# Patient Record
Sex: FEMALE | Race: OTHER | Employment: UNEMPLOYED | URBAN - METROPOLITAN AREA
[De-identification: names, ages, dates, MRNs, and addresses within clinical notes are randomized per-mention and may not be internally consistent; named-entity substitution may affect disease eponyms.]

---

## 2020-03-06 ENCOUNTER — OFFICE VISIT (OUTPATIENT)
Dept: OBGYN CLINIC | Facility: CLINIC | Age: 35
End: 2020-03-06
Payer: MEDICAID

## 2020-03-06 VITALS
DIASTOLIC BLOOD PRESSURE: 74 MMHG | WEIGHT: 141.13 LBS | HEART RATE: 80 BPM | BODY MASS INDEX: 25.97 KG/M2 | HEIGHT: 62 IN | SYSTOLIC BLOOD PRESSURE: 115 MMHG

## 2020-03-06 DIAGNOSIS — N92.6 MISSED MENSES: Primary | ICD-10-CM

## 2020-03-06 LAB
CONTROL LINE PRESENT WITH A CLEAR BACKGROUND (YES/NO): YES YES/NO
KIT LOT #: NORMAL NUMERIC
PREGNANCY TEST, URINE: POSITIVE

## 2020-03-06 PROCEDURE — 81025 URINE PREGNANCY TEST: CPT | Performed by: ADVANCED PRACTICE MIDWIFE

## 2020-03-06 PROCEDURE — 99202 OFFICE O/P NEW SF 15 MIN: CPT | Performed by: ADVANCED PRACTICE MIDWIFE

## 2020-03-06 NOTE — PROGRESS NOTES
Claudette Case is a 36year old female. HPI:   Patient presents with:  Gyn Exam: Missed menses      Sure LMP. Hx of  x 3 with CNM's at [facility]. Unmedicated birth with last baby. + Nausea all day, occasional vomiting. Denies pain or bleeding.      Apoorva Rushing tolerable to notify CNM  2. Reviewed pregnancy recommendations regarding weight gain, diet, fish consumption, consumption of deli-meats and hot, dogs, making sure food is appropriately cooked and washed to avoid food-borne illnesses.     3.  Travel discuss

## 2020-03-09 ENCOUNTER — LAB ENCOUNTER (OUTPATIENT)
Dept: LAB | Facility: HOSPITAL | Age: 35
End: 2020-03-09
Attending: ADVANCED PRACTICE MIDWIFE
Payer: MEDICAID

## 2020-03-09 ENCOUNTER — NURSE ONLY (OUTPATIENT)
Dept: OBGYN CLINIC | Facility: CLINIC | Age: 35
End: 2020-03-09
Payer: MEDICAID

## 2020-03-09 VITALS — WEIGHT: 142.38 LBS | BODY MASS INDEX: 26 KG/M2

## 2020-03-09 DIAGNOSIS — Z34.80 SUPERVISION OF OTHER NORMAL PREGNANCY: Primary | ICD-10-CM

## 2020-03-09 DIAGNOSIS — Z34.80 SUPERVISION OF OTHER NORMAL PREGNANCY: ICD-10-CM

## 2020-03-09 LAB
ANTIBODY SCREEN: NEGATIVE
BASOPHILS # BLD AUTO: 0.07 X10(3) UL (ref 0–0.2)
BASOPHILS NFR BLD AUTO: 0.9 %
DEPRECATED RDW RBC AUTO: 50 FL (ref 35.1–46.3)
EOSINOPHIL # BLD AUTO: 0.11 X10(3) UL (ref 0–0.7)
EOSINOPHIL NFR BLD AUTO: 1.4 %
ERYTHROCYTE [DISTWIDTH] IN BLOOD BY AUTOMATED COUNT: 14.8 % (ref 11–15)
HBV SURFACE AG SER-ACNC: <0.1 [IU]/L
HBV SURFACE AG SERPL QL IA: NONREACTIVE
HCT VFR BLD AUTO: 39.5 % (ref 35–48)
HCV AB SERPL QL IA: NONREACTIVE
HGB BLD-MCNC: 13.1 G/DL (ref 12–16)
IMM GRANULOCYTES # BLD AUTO: 0.03 X10(3) UL (ref 0–1)
IMM GRANULOCYTES NFR BLD: 0.4 %
LYMPHOCYTES # BLD AUTO: 1.69 X10(3) UL (ref 1–4)
LYMPHOCYTES NFR BLD AUTO: 21.1 %
MCH RBC QN AUTO: 30.5 PG (ref 26–34)
MCHC RBC AUTO-ENTMCNC: 33.2 G/DL (ref 31–37)
MCV RBC AUTO: 91.9 FL (ref 80–100)
MONOCYTES # BLD AUTO: 0.49 X10(3) UL (ref 0.1–1)
MONOCYTES NFR BLD AUTO: 6.1 %
NEUTROPHILS # BLD AUTO: 5.63 X10 (3) UL (ref 1.5–7.7)
NEUTROPHILS # BLD AUTO: 5.63 X10(3) UL (ref 1.5–7.7)
NEUTROPHILS NFR BLD AUTO: 70.1 %
PLATELET # BLD AUTO: 219 10(3)UL (ref 150–450)
RBC # BLD AUTO: 4.3 X10(6)UL (ref 3.8–5.3)
RH BLOOD TYPE: POSITIVE
RUBV IGG SER QL: POSITIVE
RUBV IGG SER-ACNC: 161 IU/ML (ref 10–?)
WBC # BLD AUTO: 8 X10(3) UL (ref 4–11)

## 2020-03-09 PROCEDURE — 86803 HEPATITIS C AB TEST: CPT

## 2020-03-09 PROCEDURE — 87389 HIV-1 AG W/HIV-1&-2 AB AG IA: CPT

## 2020-03-09 PROCEDURE — 87086 URINE CULTURE/COLONY COUNT: CPT

## 2020-03-09 PROCEDURE — 36415 COLL VENOUS BLD VENIPUNCTURE: CPT

## 2020-03-09 PROCEDURE — 86780 TREPONEMA PALLIDUM: CPT

## 2020-03-09 PROCEDURE — 86850 RBC ANTIBODY SCREEN: CPT

## 2020-03-09 PROCEDURE — 85025 COMPLETE CBC W/AUTO DIFF WBC: CPT

## 2020-03-09 PROCEDURE — 0500F INITIAL PRENATAL CARE VISIT: CPT | Performed by: ADVANCED PRACTICE MIDWIFE

## 2020-03-09 PROCEDURE — 86900 BLOOD TYPING SEROLOGIC ABO: CPT

## 2020-03-09 PROCEDURE — 87340 HEPATITIS B SURFACE AG IA: CPT

## 2020-03-09 PROCEDURE — 86901 BLOOD TYPING SEROLOGIC RH(D): CPT

## 2020-03-09 PROCEDURE — 86762 RUBELLA ANTIBODY: CPT

## 2020-03-09 RX ORDER — GLYCERIN/MINERAL OIL
LOTION (ML) TOPICAL
Status: ON HOLD | COMMUNITY
End: 2020-11-01

## 2020-03-09 RX ORDER — MULTIVITAMIN WITH IRON
25 TABLET ORAL 2 TIMES DAILY
COMMUNITY
End: 2020-08-20

## 2020-03-09 NOTE — PROGRESS NOTES
Nurse education complete & information given to pt. NOB labs ordered. Declines FTS. Last pap 4/1/19 & normal. Denies any abnormal paps. Received varicella vaccine. EPDS completed, score 2, ALDEN score 2. NOB appt scheduled.     Pt desires natural childbirth

## 2020-03-11 LAB — T PALLIDUM AB SER QL: NEGATIVE

## 2020-03-30 ENCOUNTER — TELEPHONE (OUTPATIENT)
Dept: OBGYN CLINIC | Facility: CLINIC | Age: 35
End: 2020-03-30

## 2020-04-01 ENCOUNTER — TELEPHONE (OUTPATIENT)
Dept: OBGYN CLINIC | Facility: CLINIC | Age: 35
End: 2020-04-01

## 2020-04-06 ENCOUNTER — INITIAL PRENATAL (OUTPATIENT)
Dept: OBGYN CLINIC | Facility: CLINIC | Age: 35
End: 2020-04-06
Payer: MEDICAID

## 2020-04-06 ENCOUNTER — HOSPITAL ENCOUNTER (OUTPATIENT)
Dept: ULTRASOUND IMAGING | Facility: HOSPITAL | Age: 35
Discharge: HOME | End: 2020-04-06
Attending: ADVANCED PRACTICE MIDWIFE
Payer: MEDICAID

## 2020-04-06 VITALS
DIASTOLIC BLOOD PRESSURE: 73 MMHG | SYSTOLIC BLOOD PRESSURE: 113 MMHG | WEIGHT: 145 LBS | BODY MASS INDEX: 27 KG/M2 | HEART RATE: 82 BPM

## 2020-04-06 DIAGNOSIS — Z34.81 ENCOUNTER FOR SUPERVISION OF OTHER NORMAL PREGNANCY IN FIRST TRIMESTER: Primary | ICD-10-CM

## 2020-04-06 DIAGNOSIS — Z11.3 SCREEN FOR STD (SEXUALLY TRANSMITTED DISEASE): ICD-10-CM

## 2020-04-06 DIAGNOSIS — N89.8 VAGINAL DISCHARGE: ICD-10-CM

## 2020-04-06 DIAGNOSIS — Z34.81 ENCOUNTER FOR SUPERVISION OF OTHER NORMAL PREGNANCY IN FIRST TRIMESTER: ICD-10-CM

## 2020-04-06 PROCEDURE — 0500F INITIAL PRENATAL CARE VISIT: CPT | Performed by: ADVANCED PRACTICE MIDWIFE

## 2020-04-06 PROCEDURE — 81002 URINALYSIS NONAUTO W/O SCOPE: CPT | Performed by: ADVANCED PRACTICE MIDWIFE

## 2020-04-06 PROCEDURE — 76801 OB US < 14 WKS SINGLE FETUS: CPT | Performed by: ADVANCED PRACTICE MIDWIFE

## 2020-04-06 NOTE — PATIENT INSTRUCTIONS
COVID-19 is a new disease and we are still learning how it spreads, the severity of illness it causes, and to what extent it may spread in the [place]. Pregnant Women   What is the risk to pregnant women of getting COVID-19?  Is it easier for pregn number, the virus was not found in samples of amniotic fluid or breastmilk. Infants   If a pregnant woman has COVID-19 during pregnancy, will it hurt the baby?   We do not know at this time what if any risk is posed to infants of a pregnant woman who has exceptions when breastfeeding or feeding expressed breast milk is not recommended. CDC has no specific guidance for breastfeeding during infection with similar viruses like SARS-CoV or [place] Respiratory Syndrome (MERS-CoV).   Outside of the immedia

## 2020-04-06 NOTE — PROGRESS NOTES
NOB teaching reviewed  Vagnosis screen completed  U/S ordered s>d  Covid precautions  Warning signs reviewed  All questions answered

## 2020-04-08 ENCOUNTER — TELEPHONE (OUTPATIENT)
Dept: OBGYN CLINIC | Facility: CLINIC | Age: 35
End: 2020-04-08

## 2020-04-08 RX ORDER — ONDANSETRON 4 MG/1
4 TABLET, FILM COATED ORAL
Qty: 30 TABLET | Refills: 0 | Status: SHIPPED | OUTPATIENT
Start: 2020-04-08 | End: 2020-05-08

## 2020-04-08 NOTE — TELEPHONE ENCOUNTER
----- Message from Simone Adam CNM sent at 4/8/2020  8:57 AM CDT -----  Vaginal culture positive for yeast.  Pt can use Monostat 7 day OTC safe in pregnancy

## 2020-04-08 NOTE — TELEPHONE ENCOUNTER
notified pt of results & instructions per [facility]. Pt states [facility] told her at appt she was going to prescribe antinausea rx. Advised I will send to [facility] & get back to her. Pharmacy confirmed.  Pt verbalized an understanding & agrees w/ plan    Routed to EDWIN

## 2020-05-06 ENCOUNTER — TELEMEDICINE (OUTPATIENT)
Dept: OBGYN CLINIC | Facility: CLINIC | Age: 35
End: 2020-05-06

## 2020-05-06 DIAGNOSIS — Z34.82 ENCOUNTER FOR SUPERVISION OF OTHER NORMAL PREGNANCY IN SECOND TRIMESTER: Primary | ICD-10-CM

## 2020-05-06 PROCEDURE — 0502F SUBSEQUENT PRENATAL CARE: CPT | Performed by: ADVANCED PRACTICE MIDWIFE

## 2020-05-06 NOTE — PROGRESS NOTES
Video visit completed. Will schedule 20 week scan. Nausea improved but still occasionally vomiting. Denies DV concerns. APpropriate for phone / virtual visit with next. Danger signs reviewed.

## 2020-06-03 ENCOUNTER — TELEPHONE (OUTPATIENT)
Dept: OBGYN CLINIC | Facility: CLINIC | Age: 35
End: 2020-06-03

## 2020-06-03 ENCOUNTER — TELEPHONE (OUTPATIENT)
Dept: PERINATAL CARE | Facility: HOSPITAL | Age: 35
End: 2020-06-03

## 2020-06-03 NOTE — TELEPHONE ENCOUNTER
----- Message from [facility] sent at 6/3/2020  8:52 AM CDT -----  Regarding: [facility] PRIOR AUTHORIZATION  PATIENT SEEMS TO HAVE SWITCHED TO [facility] MEDICAID. WILL NEED PRIOR AUTHORIZATION FOR APPOINTMENT SCHEDULED 6/9/20.       Cecelia

## 2020-06-03 NOTE — TELEPHONE ENCOUNTER
Insurance info not updated. Cannot do PA. Advised pt I need [employer] for a [place]. Pt does not have a card but will go online & either call back or send a [employer] message w/ info.  Pt verbalized an understanding & agrees w/ plan

## 2020-06-08 NOTE — PROGRESS NOTES
/82   Pulse 96   Wt 154 lb (69.9 kg)   LMP 01/17/2020   BMI 28.17 kg/m²      STANDARD OBSTETRIC ULTRASOUND REPORT   See imaging tab for complete consultation / ultrasound report      Fetal Heart Rate: Present 142 bpm  Fetal Presentation: breech  Amni Debbi Servin. Apoorva Hartley M.D. This was an ultrasound only encounter (no physician visit). The ultrasound was read by Dr. Apoorva Hartley and the report was sent to Ms. Jennings to discuss with the patient.

## 2020-06-09 ENCOUNTER — TELEPHONE (OUTPATIENT)
Dept: OBGYN CLINIC | Facility: CLINIC | Age: 35
End: 2020-06-09

## 2020-06-09 ENCOUNTER — HOSPITAL ENCOUNTER (OUTPATIENT)
Dept: PERINATAL CARE | Facility: HOSPITAL | Age: 35
Discharge: HOME | End: 2020-06-09
Attending: OBSTETRICS & GYNECOLOGY
Payer: MEDICAID

## 2020-06-09 ENCOUNTER — HOSPITAL ENCOUNTER (OUTPATIENT)
Dept: PERINATAL CARE | Facility: HOSPITAL | Age: 35
Discharge: HOME | End: 2020-06-09
Attending: ADVANCED PRACTICE MIDWIFE
Payer: MEDICAID

## 2020-06-09 VITALS
BODY MASS INDEX: 28 KG/M2 | DIASTOLIC BLOOD PRESSURE: 82 MMHG | WEIGHT: 154 LBS | HEART RATE: 96 BPM | SYSTOLIC BLOOD PRESSURE: 132 MMHG

## 2020-06-09 DIAGNOSIS — Z36.3 ENCOUNTER FOR ANTENATAL SCREENING FOR MALFORMATION: ICD-10-CM

## 2020-06-09 DIAGNOSIS — O26.879 SHORT CERVIX AFFECTING PREGNANCY: ICD-10-CM

## 2020-06-09 DIAGNOSIS — Z36.3 ENCOUNTER FOR ANTENATAL SCREENING FOR MALFORMATION: Primary | ICD-10-CM

## 2020-06-09 DIAGNOSIS — O47.02 THREATENED PRETERM LABOR, SECOND TRIMESTER: Primary | ICD-10-CM

## 2020-06-09 DIAGNOSIS — Z03.75 SUSPECTED SHORTENING OF CERVIX NOT FOUND: ICD-10-CM

## 2020-06-09 PROBLEM — Z33.1 IUP (INTRAUTERINE PREGNANCY), INCIDENTAL: Status: ACTIVE | Noted: 2020-06-09

## 2020-06-09 PROCEDURE — 76805 OB US >/= 14 WKS SNGL FETUS: CPT | Performed by: OBSTETRICS & GYNECOLOGY

## 2020-06-09 PROCEDURE — 76817 TRANSVAGINAL US OBSTETRIC: CPT | Performed by: OBSTETRICS & GYNECOLOGY

## 2020-06-09 NOTE — TELEPHONE ENCOUNTER
Phoned patient to notify her of verbal report received from [facility] regarding shortened cervix and recommendation for nightly progesterone PV with repeat ultrasound in 1 week to measure cervical length. eRx Endometrin and ultrasound ordered placed.   Pt voiced

## 2020-06-11 ENCOUNTER — TELEPHONE (OUTPATIENT)
Dept: OBGYN CLINIC | Facility: CLINIC | Age: 35
End: 2020-06-11

## 2020-06-11 ENCOUNTER — TELEPHONE (OUTPATIENT)
Dept: PERINATAL CARE | Facility: HOSPITAL | Age: 35
End: 2020-06-11

## 2020-06-11 NOTE — TELEPHONE ENCOUNTER
----- Message from [facility] sent at 6/11/2020  8:37 AM CDT -----  Regarding: [facility] PRIOR AUTHORIZATION  PATIENT SCHEDULED 6/15/20 FOR CERVICAL LENGTH, WILL REQUIRE PRIOR AUTHORIZATION.     [address]

## 2020-06-12 ENCOUNTER — TELEPHONE (OUTPATIENT)
Dept: PERINATAL CARE | Facility: HOSPITAL | Age: 35
End: 2020-06-12

## 2020-06-12 NOTE — TELEPHONE ENCOUNTER
Pt Has appt 6/15 7:30 AM  [facility] has Prior Auth under Medical Review so Authorization form Insurance will not be obtained prior to appointment time  Pt Contacted to offer Appointments 6/17 and 6/19  Pt states she is going to [facility] Tuesday thr

## 2020-06-15 ENCOUNTER — ROUTINE PRENATAL (OUTPATIENT)
Dept: OBGYN CLINIC | Facility: CLINIC | Age: 35
End: 2020-06-15
Payer: MEDICAID

## 2020-06-15 ENCOUNTER — TELEPHONE (OUTPATIENT)
Dept: OBGYN CLINIC | Facility: CLINIC | Age: 35
End: 2020-06-15

## 2020-06-15 VITALS
HEART RATE: 80 BPM | BODY MASS INDEX: 28.96 KG/M2 | DIASTOLIC BLOOD PRESSURE: 72 MMHG | HEIGHT: 62 IN | SYSTOLIC BLOOD PRESSURE: 118 MMHG | WEIGHT: 157.38 LBS

## 2020-06-15 DIAGNOSIS — O26.879 SHORT CERVIX AFFECTING PREGNANCY: ICD-10-CM

## 2020-06-15 DIAGNOSIS — Z34.82 PRENATAL CARE, SUBSEQUENT PREGNANCY, SECOND TRIMESTER: Primary | ICD-10-CM

## 2020-06-15 PROCEDURE — 0502F SUBSEQUENT PRENATAL CARE: CPT | Performed by: ADVANCED PRACTICE MIDWIFE

## 2020-06-15 PROCEDURE — 81002 URINALYSIS NONAUTO W/O SCOPE: CPT | Performed by: ADVANCED PRACTICE MIDWIFE

## 2020-06-15 NOTE — PATIENT INSTRUCTIONS
Adapting to Pregnancy: Second Trimester    Keep up the healthy habits you started in your first trimester. You might be a little more tired than normal. So plan your day wisely. Look at the tips below and choose the ones that suit your lifestyle.   If you Continuing as lovers  Unless your healthcare provider tells you otherwise, there is no reason to stop having sex now. Blood supply increases to the pelvic area in the second trimester. Because of this, sex might be more enjoyable.  Try different positions a The second trimester is often easier than the first. Still, be prepared for mood swings. These are due to the increase in hormones (chemicals that affect the way organs work) produced by your body. These mood swings are a normal part of pregnancy.   How you

## 2020-06-15 NOTE — TELEPHONE ENCOUNTER
Pt has not been seen since early may. Going out of town tomorrow. mfm u/s rescheduled for next week. appt scheduled for today.  Pt verbalized an understanding & agrees w/ plan

## 2020-06-15 NOTE — TELEPHONE ENCOUNTER
pt. states that her MFM appt. was changed to 6/22/2020, so she wants to know when should she be seen for PN, and then the call was dropped?

## 2020-06-15 NOTE — PROGRESS NOTES
Dorothea David is here for her BRAD visit. Currently, she is feeling well. Denies 2nd trimester danger signs. Does feel some cramping on and off throughout the day but less than once/hour. Using progesterone vaginally each night.     Total weight gain in pregnan

## 2020-06-21 NOTE — PROGRESS NOTES
Outpatient Maternal-Fetal Medicine Consultation    Dear Ms. Pj Harmon: Thank you for requesting ultrasound evaluation and maternal fetal medicine consultation on your patient Glory Sam.   As you are aware she is a 30year old female  with a singl mucous membrane lesions. Past Surgical History  The patient  has no past surgical history on file. Family History  The patient She indicated that her mother is alive. She indicated that her father is alive.  She indicated that her maternal grandmother At that time the cervix a SHORT cervical length (2.63 cm shortened to 2.45 cm with fundal pressure) was noted with cervical funneling.   The patient's midwife was contacted and vaginal progesterone was advised as well as a follow-up cervical length assessm

## 2020-06-22 ENCOUNTER — TELEPHONE (OUTPATIENT)
Dept: OBGYN CLINIC | Facility: CLINIC | Age: 35
End: 2020-06-22

## 2020-06-22 ENCOUNTER — HOSPITAL ENCOUNTER (OUTPATIENT)
Dept: PERINATAL CARE | Facility: HOSPITAL | Age: 35
Discharge: HOME | End: 2020-06-22
Attending: OBSTETRICS & GYNECOLOGY
Payer: MEDICAID

## 2020-06-22 ENCOUNTER — HOSPITAL ENCOUNTER (OUTPATIENT)
Dept: PERINATAL CARE | Facility: HOSPITAL | Age: 35
Discharge: HOME | End: 2020-06-22
Attending: ADVANCED PRACTICE MIDWIFE
Payer: MEDICAID

## 2020-06-22 VITALS
BODY MASS INDEX: 28.89 KG/M2 | DIASTOLIC BLOOD PRESSURE: 82 MMHG | HEIGHT: 62 IN | WEIGHT: 157 LBS | HEART RATE: 113 BPM | SYSTOLIC BLOOD PRESSURE: 120 MMHG

## 2020-06-22 DIAGNOSIS — O26.879 SHORT CERVIX AFFECTING PREGNANCY: ICD-10-CM

## 2020-06-22 DIAGNOSIS — O26.879 SHORT CERVIX AFFECTING PREGNANCY: Primary | ICD-10-CM

## 2020-06-22 PROCEDURE — 76817 TRANSVAGINAL US OBSTETRIC: CPT | Performed by: OBSTETRICS & GYNECOLOGY

## 2020-06-22 PROCEDURE — 76815 OB US LIMITED FETUS(S): CPT | Performed by: OBSTETRICS & GYNECOLOGY

## 2020-06-22 PROCEDURE — 99203 OFFICE O/P NEW LOW 30 MIN: CPT | Performed by: OBSTETRICS & GYNECOLOGY

## 2020-06-22 NOTE — TELEPHONE ENCOUNTER
Pt needs cervical length for next week and cerclage placement for tomorrow 6/22/20.  PA initiated-       Cervical length-  [id number]  Case #-[id number]    Cerclage-  [facility]-[address]1986  [phone]-shortened cervix  Case #-[id number]    Case information faxed to [phone]

## 2020-06-23 ENCOUNTER — HOSPITAL ENCOUNTER (OUTPATIENT)
Facility: HOSPITAL | Age: 35
Setting detail: OBSERVATION
Discharge: HOME | End: 2020-06-23
Attending: OBSTETRICS & GYNECOLOGY | Admitting: OBSTETRICS & GYNECOLOGY
Payer: MEDICAID

## 2020-06-23 ENCOUNTER — ANESTHESIA (OUTPATIENT)
Dept: OBGYN UNIT | Facility: HOSPITAL | Age: 35
End: 2020-06-23
Payer: MEDICAID

## 2020-06-23 ENCOUNTER — ANESTHESIA EVENT (OUTPATIENT)
Dept: OBGYN UNIT | Facility: HOSPITAL | Age: 35
End: 2020-06-23
Payer: MEDICAID

## 2020-06-23 VITALS
HEART RATE: 75 BPM | TEMPERATURE: 98 F | WEIGHT: 154 LBS | DIASTOLIC BLOOD PRESSURE: 74 MMHG | OXYGEN SATURATION: 100 % | SYSTOLIC BLOOD PRESSURE: 121 MMHG | BODY MASS INDEX: 28.34 KG/M2 | RESPIRATION RATE: 16 BRPM | HEIGHT: 62 IN

## 2020-06-23 PROBLEM — O34.30 INCOMPETENT CERVIX IN PREGNANCY, ANTEPARTUM: Status: ACTIVE | Noted: 2020-06-23

## 2020-06-23 PROCEDURE — 81003 URINALYSIS AUTO W/O SCOPE: CPT | Performed by: OBSTETRICS & GYNECOLOGY

## 2020-06-23 PROCEDURE — 0UVC7ZZ RESTRICTION OF CERVIX, VIA NATURAL OR ARTIFICIAL OPENING: ICD-10-PCS | Performed by: OBSTETRICS & GYNECOLOGY

## 2020-06-23 PROCEDURE — 59320 REVISION OF CERVIX: CPT

## 2020-06-23 PROCEDURE — 85025 COMPLETE CBC W/AUTO DIFF WBC: CPT | Performed by: OBSTETRICS & GYNECOLOGY

## 2020-06-23 RX ORDER — LIDOCAINE HYDROCHLORIDE 10 MG/ML
INJECTION, SOLUTION EPIDURAL; INFILTRATION; INTRACAUDAL; PERINEURAL AS NEEDED
Status: DISCONTINUED | OUTPATIENT
Start: 2020-06-23 | End: 2020-06-23 | Stop reason: SURG

## 2020-06-23 RX ORDER — DIPHENHYDRAMINE HYDROCHLORIDE 50 MG/ML
25 INJECTION INTRAMUSCULAR; INTRAVENOUS ONCE AS NEEDED
Status: CANCELLED | OUTPATIENT
Start: 2020-06-23 | End: 2020-06-23

## 2020-06-23 RX ORDER — KETOROLAC TROMETHAMINE 30 MG/ML
30 INJECTION, SOLUTION INTRAMUSCULAR; INTRAVENOUS ONCE AS NEEDED
Status: CANCELLED | OUTPATIENT
Start: 2020-06-23 | End: 2020-06-23

## 2020-06-23 RX ORDER — ONDANSETRON 2 MG/ML
4 INJECTION INTRAMUSCULAR; INTRAVENOUS ONCE AS NEEDED
Status: CANCELLED | OUTPATIENT
Start: 2020-06-23 | End: 2020-06-23

## 2020-06-23 RX ORDER — BUPIVACAINE HYDROCHLORIDE 7.5 MG/ML
INJECTION, SOLUTION INTRASPINAL AS NEEDED
Status: DISCONTINUED | OUTPATIENT
Start: 2020-06-23 | End: 2020-06-23 | Stop reason: SURG

## 2020-06-23 RX ORDER — SODIUM CHLORIDE, SODIUM LACTATE, POTASSIUM CHLORIDE, CALCIUM CHLORIDE 600; 310; 30; 20 MG/100ML; MG/100ML; MG/100ML; MG/100ML
INJECTION, SOLUTION INTRAVENOUS CONTINUOUS
Status: CANCELLED | OUTPATIENT
Start: 2020-06-23

## 2020-06-23 RX ORDER — SODIUM CHLORIDE, SODIUM LACTATE, POTASSIUM CHLORIDE, CALCIUM CHLORIDE 600; 310; 30; 20 MG/100ML; MG/100ML; MG/100ML; MG/100ML
INJECTION, SOLUTION INTRAVENOUS CONTINUOUS
Status: DISCONTINUED | OUTPATIENT
Start: 2020-06-23 | End: 2020-06-23

## 2020-06-23 RX ADMIN — LIDOCAINE HYDROCHLORIDE 3 MG: 10 INJECTION, SOLUTION EPIDURAL; INFILTRATION; INTRACAUDAL; PERINEURAL at 12:31:00

## 2020-06-23 RX ADMIN — BUPIVACAINE HYDROCHLORIDE 1.2 ML: 7.5 INJECTION, SOLUTION INTRASPINAL at 12:36:00

## 2020-06-23 RX ADMIN — SODIUM CHLORIDE, SODIUM LACTATE, POTASSIUM CHLORIDE, CALCIUM CHLORIDE: 600; 310; 30; 20 INJECTION, SOLUTION INTRAVENOUS at 12:55:00

## 2020-06-23 NOTE — ANESTHESIA PROCEDURE NOTES
Spinal Block  Performed by: Amber Mcguire MD  Authorized by: Amber Mcguire MD       General Information and Staff    Start Time:  6/23/2020 12:31 PM  End Time:  6/23/2020 12:36 PM  Anesthesiologist:  Amber Mcguire MD  Performed by:   Zohaib

## 2020-06-23 NOTE — ANESTHESIA PREPROCEDURE EVALUATION
Anesthesia PreOp Note    HPI:     Violet Watkins is a 30year old female who presents for preoperative consultation requested by:  Johnita Duane, DO    Date of Surgery: 6/23/2020    Procedure(s):  CERCLAGE  Indication: cerclage placement    Relevant Prob children: 3      Years of education: 10      Highest education level: GED or equivalent    Occupational History      Occupation:     Social Needs      Financial resource strain: Not hard at all      Food insecurity:        Worry: Never tr MCV 89.3 06/23/2020    MCH 31.0 06/23/2020    MCHC 34.8 06/23/2020    RDW 14.4 06/23/2020    .0 06/23/2020             Vital Signs: Body mass index is 28.17 kg/m². height is 1.575 m (5' 2\") and weight is 69.9 kg (154 lb).  Her oral temperature

## 2020-06-23 NOTE — OPERATIVE REPORT
Preoperative Diagnosis:  1. IUP @ 22w4d                                      2.   Incompetent cervix                                 Postoperative diagnosis:    Same    Procedure:   Cerclage    Surgeon:  Uzma Estrada D.O.   Estimated Blood loss:   Less

## 2020-06-23 NOTE — ANESTHESIA POSTPROCEDURE EVALUATION
Patient: Jarocho Townsend    Procedure Summary     Date:  06/23/20 Room / Location:  [facility] L+D OR 01 / [facility] L[facility] OR    Anesthesia Start:  1226 Anesthesia Stop:      Procedure:  [id number] (N/A Cervix) Diagnosis:  (same as preop)    Surgeon:  DO SWATI Ni

## 2020-06-24 NOTE — H&P
[facility] AND [facility]  H&P    Claudette Case Patient Status:  Observation    3/15/1985 MRN [id number]   Location [address] Attending No att. providers found   Hosp Day # 0 PCP No primary care provider on file.      SUBJECTIVE:  Re to auscultation bilaterally  Heart: regular rate and rhythm  Abdomen: soft, non-tender; bowel sounds normal; no masses,  no organomegaly  Pelvic: external genitalia normal, no adnexal masses or tenderness, no cervical motion tenderness, uterus normal size, cervical shortening (was 2.45 cm, now 1.0 cm with funneling        PLAN:  Cerclage today      Annie Brasher D.O.  6/23/2020  Late entry

## 2020-06-24 NOTE — PAYOR COMM NOTE
[place]  OBSERVATION STATUS      --------------  ADMISSION REVIEW     Payor: [address] [place] [address]  Subscriber #:  [id number]  Authorization Number: [id number]

## 2020-06-30 ENCOUNTER — HOSPITAL ENCOUNTER (OUTPATIENT)
Dept: PERINATAL CARE | Facility: HOSPITAL | Age: 35
Discharge: HOME | End: 2020-06-30
Attending: OBSTETRICS & GYNECOLOGY
Payer: MEDICAID

## 2020-06-30 VITALS
BODY MASS INDEX: 29 KG/M2 | SYSTOLIC BLOOD PRESSURE: 128 MMHG | DIASTOLIC BLOOD PRESSURE: 76 MMHG | HEART RATE: 94 BPM | WEIGHT: 160 LBS

## 2020-06-30 DIAGNOSIS — O34.30 INCOMPETENT CERVIX IN PREGNANCY, ANTEPARTUM: ICD-10-CM

## 2020-06-30 DIAGNOSIS — O26.879 SHORT CERVIX AFFECTING PREGNANCY: ICD-10-CM

## 2020-06-30 DIAGNOSIS — O34.30 INCOMPETENT CERVIX IN PREGNANCY, ANTEPARTUM: Primary | ICD-10-CM

## 2020-06-30 PROCEDURE — 76815 OB US LIMITED FETUS(S): CPT | Performed by: OBSTETRICS & GYNECOLOGY

## 2020-06-30 PROCEDURE — 99213 OFFICE O/P EST LOW 20 MIN: CPT | Performed by: OBSTETRICS & GYNECOLOGY

## 2020-06-30 PROCEDURE — 76817 TRANSVAGINAL US OBSTETRIC: CPT | Performed by: OBSTETRICS & GYNECOLOGY

## 2020-06-30 NOTE — PROGRESS NOTES
Outpatient Maternal-Fetal Medicine Follow-Up    Dear Ms. Enio Escalante    Thank you for requesting ultrasound evaluation and maternal fetal medicine consultation on your patient Kalyan Brand.   As you are aware she is a 30year old female  with a jeffrey length assessment. Today, the cervical length was markedly shorter measuring 1.0 cm with funneling     I discussed the above findings with the patient.   I reviewed the increased risk of  delivery associated with a short cervix and options of treate

## 2020-07-14 ENCOUNTER — ROUTINE PRENATAL (OUTPATIENT)
Dept: OBGYN CLINIC | Facility: CLINIC | Age: 35
End: 2020-07-14
Payer: MEDICAID

## 2020-07-14 VITALS
HEART RATE: 105 BPM | WEIGHT: 164 LBS | DIASTOLIC BLOOD PRESSURE: 63 MMHG | SYSTOLIC BLOOD PRESSURE: 93 MMHG | HEIGHT: 62 IN | BODY MASS INDEX: 30.18 KG/M2

## 2020-07-14 DIAGNOSIS — Z11.3 SCREEN FOR STD (SEXUALLY TRANSMITTED DISEASE): ICD-10-CM

## 2020-07-14 DIAGNOSIS — O34.30 INCOMPETENT CERVIX IN PREGNANCY, ANTEPARTUM: ICD-10-CM

## 2020-07-14 DIAGNOSIS — Z34.82 PRENATAL CARE, SUBSEQUENT PREGNANCY, SECOND TRIMESTER: Primary | ICD-10-CM

## 2020-07-14 PROBLEM — O26.879 SHORT CERVIX AFFECTING PREGNANCY: Status: RESOLVED | Noted: 2020-06-09 | Resolved: 2020-07-14

## 2020-07-14 LAB
APPEARANCE: CLEAR
MULTISTIX LOT#: 1044 NUMERIC
PH, URINE: 8 (ref 4.5–8)
SPECIFIC GRAVITY: 1.01 (ref 1–1.03)
URINE-COLOR: YELLOW
UROBILINOGEN,SEMI-QN: 0.2 MG/DL (ref 0–1.9)

## 2020-07-14 PROCEDURE — 0502F SUBSEQUENT PRENATAL CARE: CPT | Performed by: ADVANCED PRACTICE MIDWIFE

## 2020-07-14 PROCEDURE — 81002 URINALYSIS NONAUTO W/O SCOPE: CPT | Performed by: ADVANCED PRACTICE MIDWIFE

## 2020-07-14 NOTE — PROGRESS NOTES
Siobhan Sarscooby is here for her BRAD visit, S/P Rescue Cerclage on 6/23. Currently, she is feeling well. Denies 2nd trimester danger signs and reports lower abdominal pressure has gone away. She continues to use vaginal progesterone daily.  Her only concern today is

## 2020-07-15 LAB
C TRACH DNA SPEC QL NAA+PROBE: NEGATIVE
N GONORRHOEA DNA SPEC QL NAA+PROBE: NEGATIVE

## 2020-07-17 LAB
GENITAL VAGINOSIS SCREEN: NEGATIVE
TRICHOMONAS SCREEN: NEGATIVE

## 2020-07-21 ENCOUNTER — APPOINTMENT (OUTPATIENT)
Dept: LAB | Facility: HOSPITAL | Age: 35
End: 2020-07-21
Attending: ADVANCED PRACTICE MIDWIFE
Payer: MEDICAID

## 2020-07-21 DIAGNOSIS — Z34.82 PRENATAL CARE, SUBSEQUENT PREGNANCY, SECOND TRIMESTER: ICD-10-CM

## 2020-07-21 LAB
DEPRECATED RDW RBC AUTO: 46.2 FL (ref 35.1–46.3)
ERYTHROCYTE [DISTWIDTH] IN BLOOD BY AUTOMATED COUNT: 14.2 % (ref 11–15)
GLUCOSE 1H P GLC SERPL-MCNC: 104 MG/DL
HCT VFR BLD AUTO: 33.7 % (ref 35–48)
HGB BLD-MCNC: 11.4 G/DL (ref 12–16)
MCH RBC QN AUTO: 30.2 PG (ref 26–34)
MCHC RBC AUTO-ENTMCNC: 33.8 G/DL (ref 31–37)
MCV RBC AUTO: 89.4 FL (ref 80–100)
PLATELET # BLD AUTO: 187 10(3)UL (ref 150–450)
RBC # BLD AUTO: 3.77 X10(6)UL (ref 3.8–5.3)
WBC # BLD AUTO: 8 X10(3) UL (ref 4–11)

## 2020-07-21 PROCEDURE — 87389 HIV-1 AG W/HIV-1&-2 AB AG IA: CPT | Performed by: ADVANCED PRACTICE MIDWIFE

## 2020-07-21 PROCEDURE — 82950 GLUCOSE TEST: CPT

## 2020-07-21 PROCEDURE — 85027 COMPLETE CBC AUTOMATED: CPT

## 2020-07-21 PROCEDURE — 86780 TREPONEMA PALLIDUM: CPT

## 2020-07-21 PROCEDURE — 36415 COLL VENOUS BLD VENIPUNCTURE: CPT

## 2020-07-22 LAB — T PALLIDUM AB SER QL: NEGATIVE

## 2020-08-10 ENCOUNTER — ROUTINE PRENATAL (OUTPATIENT)
Dept: OBGYN CLINIC | Facility: CLINIC | Age: 35
End: 2020-08-10
Payer: MEDICAID

## 2020-08-10 VITALS
HEART RATE: 105 BPM | WEIGHT: 170.38 LBS | SYSTOLIC BLOOD PRESSURE: 125 MMHG | BODY MASS INDEX: 31.35 KG/M2 | HEIGHT: 62 IN | DIASTOLIC BLOOD PRESSURE: 75 MMHG

## 2020-08-10 DIAGNOSIS — Z34.83 PRENATAL CARE, SUBSEQUENT PREGNANCY, THIRD TRIMESTER: Primary | ICD-10-CM

## 2020-08-10 PROCEDURE — 90715 TDAP VACCINE 7 YRS/> IM: CPT | Performed by: ADVANCED PRACTICE MIDWIFE

## 2020-08-10 PROCEDURE — 3008F BODY MASS INDEX DOCD: CPT | Performed by: ADVANCED PRACTICE MIDWIFE

## 2020-08-10 PROCEDURE — 0502F SUBSEQUENT PRENATAL CARE: CPT | Performed by: ADVANCED PRACTICE MIDWIFE

## 2020-08-10 PROCEDURE — 3078F DIAST BP <80 MM HG: CPT | Performed by: ADVANCED PRACTICE MIDWIFE

## 2020-08-10 PROCEDURE — 3074F SYST BP LT 130 MM HG: CPT | Performed by: ADVANCED PRACTICE MIDWIFE

## 2020-08-10 PROCEDURE — 90471 IMMUNIZATION ADMIN: CPT | Performed by: ADVANCED PRACTICE MIDWIFE

## 2020-08-10 PROCEDURE — 81002 URINALYSIS NONAUTO W/O SCOPE: CPT | Performed by: ADVANCED PRACTICE MIDWIFE

## 2020-08-10 NOTE — PROGRESS NOTES
Feeling well, active baby. Denies s/s PTL including UCs, bleeding or LOF. Reviewed 28 week labs and packet. TDAP today. Desires TL following delivery, info provided and can discuss at [facility] consult visit.    Reviewed warning signs, s/s PTL and importance o

## 2020-08-20 ENCOUNTER — OFFICE VISIT (OUTPATIENT)
Dept: OBGYN CLINIC | Facility: CLINIC | Age: 35
End: 2020-08-20
Payer: MEDICAID

## 2020-08-20 VITALS
HEART RATE: 99 BPM | SYSTOLIC BLOOD PRESSURE: 124 MMHG | BODY MASS INDEX: 31 KG/M2 | WEIGHT: 171 LBS | DIASTOLIC BLOOD PRESSURE: 80 MMHG

## 2020-08-20 DIAGNOSIS — Z30.09 GENERAL COUNSELING AND ADVICE ON FEMALE CONTRACEPTION: ICD-10-CM

## 2020-08-20 DIAGNOSIS — O34.30 INCOMPETENT CERVIX IN PREGNANCY, ANTEPARTUM: Primary | ICD-10-CM

## 2020-08-20 PROCEDURE — 99203 OFFICE O/P NEW LOW 30 MIN: CPT | Performed by: OBSTETRICS & GYNECOLOGY

## 2020-08-20 PROCEDURE — 3079F DIAST BP 80-89 MM HG: CPT | Performed by: OBSTETRICS & GYNECOLOGY

## 2020-08-20 PROCEDURE — 3074F SYST BP LT 130 MM HG: CPT | Performed by: OBSTETRICS & GYNECOLOGY

## 2020-08-21 NOTE — PROGRESS NOTES
[facility], [address]    OB Consult    Mayank Wells Patient Status:  Outpatient    3/15/1985 MRN [id number]   Location [address] Attending No att. providers found   Hosp Day # 0 PCP No primary care provider GC DNA        Chlamydia DNA        GTT 1 Hr        Glucose Fasting        Glucose 1 Hr        Glucose 2 Hr        Glucose 3 Hr        HgB A1c        Vitamin D              8-20 Weeks     Test Value Reference Range Date Time    1st Trimester Aneuploidy Ris Cystic Fibrosis[165] (Required questions in OE to answer)        Cystic Fibrosis[165] (Required questions in OE to answer)        Sickle Cell        24Hr Urine Protein        24Hr Urine Creatinine        Parvo B19 IgM        Parvo B19 IgG Patient desires permanent sterilization and I discussed with her the option of a laparoscopic bilateral salpingectomy. Patient counseled on laparoscopic bilateral salpingectomy for permanent contraception.   Discussed that procedure is permanent and not in

## 2020-08-24 ENCOUNTER — ROUTINE PRENATAL (OUTPATIENT)
Dept: OBGYN CLINIC | Facility: CLINIC | Age: 35
End: 2020-08-24
Payer: MEDICAID

## 2020-08-24 VITALS
HEIGHT: 62 IN | WEIGHT: 173.13 LBS | DIASTOLIC BLOOD PRESSURE: 76 MMHG | HEART RATE: 89 BPM | SYSTOLIC BLOOD PRESSURE: 133 MMHG | BODY MASS INDEX: 31.86 KG/M2

## 2020-08-24 DIAGNOSIS — Z34.83 PRENATAL CARE, SUBSEQUENT PREGNANCY, THIRD TRIMESTER: Primary | ICD-10-CM

## 2020-08-24 PROCEDURE — 81002 URINALYSIS NONAUTO W/O SCOPE: CPT | Performed by: ADVANCED PRACTICE MIDWIFE

## 2020-08-24 PROCEDURE — 3078F DIAST BP <80 MM HG: CPT | Performed by: ADVANCED PRACTICE MIDWIFE

## 2020-08-24 PROCEDURE — 0502F SUBSEQUENT PRENATAL CARE: CPT | Performed by: ADVANCED PRACTICE MIDWIFE

## 2020-08-24 PROCEDURE — 3075F SYST BP GE 130 - 139MM HG: CPT | Performed by: ADVANCED PRACTICE MIDWIFE

## 2020-08-24 PROCEDURE — 3008F BODY MASS INDEX DOCD: CPT | Performed by: ADVANCED PRACTICE MIDWIFE

## 2020-08-25 ENCOUNTER — MED REC SCAN ONLY (OUTPATIENT)
Dept: OBGYN CLINIC | Facility: CLINIC | Age: 35
End: 2020-08-25

## 2020-08-28 ENCOUNTER — ROUTINE PRENATAL (OUTPATIENT)
Dept: OBGYN CLINIC | Facility: CLINIC | Age: 35
End: 2020-08-28
Payer: MEDICAID

## 2020-08-28 ENCOUNTER — HOSPITAL ENCOUNTER (OUTPATIENT)
Facility: HOSPITAL | Age: 35
Setting detail: OBSERVATION
Discharge: HOME | End: 2020-08-28
Attending: ADVANCED PRACTICE MIDWIFE | Admitting: OBSTETRICS & GYNECOLOGY
Payer: MEDICAID

## 2020-08-28 VITALS
DIASTOLIC BLOOD PRESSURE: 77 MMHG | HEART RATE: 83 BPM | SYSTOLIC BLOOD PRESSURE: 120 MMHG | WEIGHT: 174 LBS | BODY MASS INDEX: 32.02 KG/M2 | HEIGHT: 62 IN

## 2020-08-28 VITALS
DIASTOLIC BLOOD PRESSURE: 86 MMHG | HEART RATE: 94 BPM | BODY MASS INDEX: 32.02 KG/M2 | HEIGHT: 62 IN | WEIGHT: 174 LBS | SYSTOLIC BLOOD PRESSURE: 146 MMHG

## 2020-08-28 DIAGNOSIS — Z34.83 PRENATAL CARE, SUBSEQUENT PREGNANCY, THIRD TRIMESTER: Primary | ICD-10-CM

## 2020-08-28 PROBLEM — O16.3 ELEVATED BLOOD PRESSURE AFFECTING PREGNANCY IN THIRD TRIMESTER, ANTEPARTUM: Status: ACTIVE | Noted: 2020-08-28

## 2020-08-28 PROBLEM — Z34.90 PREGNANCY: Status: ACTIVE | Noted: 2020-08-28

## 2020-08-28 LAB
ALBUMIN SERPL-MCNC: 2.6 G/DL (ref 3.4–5)
ALBUMIN/GLOB SERPL: 0.6 {RATIO} (ref 1–2)
ALP LIVER SERPL-CCNC: 115 U/L (ref 37–98)
ALT SERPL-CCNC: 15 U/L (ref 13–56)
ANION GAP SERPL CALC-SCNC: 7 MMOL/L (ref 0–18)
APPEARANCE: CLEAR
AST SERPL-CCNC: 17 U/L (ref 15–37)
BASOPHILS # BLD AUTO: 0.05 X10(3) UL (ref 0–0.2)
BASOPHILS NFR BLD AUTO: 0.6 %
BILIRUB SERPL-MCNC: 0.4 MG/DL (ref 0.1–2)
BUN BLD-MCNC: 9 MG/DL (ref 7–18)
BUN/CREAT SERPL: 16.7 (ref 10–20)
CALCIUM BLD-MCNC: 9.2 MG/DL (ref 8.5–10.1)
CHLORIDE SERPL-SCNC: 110 MMOL/L (ref 98–112)
CO2 SERPL-SCNC: 22 MMOL/L (ref 21–32)
CREAT BLD-MCNC: 0.54 MG/DL (ref 0.55–1.02)
CREAT UR-SCNC: 88.1 MG/DL
DEPRECATED RDW RBC AUTO: 44.5 FL (ref 35.1–46.3)
EOSINOPHIL # BLD AUTO: 0.14 X10(3) UL (ref 0–0.7)
EOSINOPHIL NFR BLD AUTO: 1.7 %
ERYTHROCYTE [DISTWIDTH] IN BLOOD BY AUTOMATED COUNT: 14 % (ref 11–15)
GLOBULIN PLAS-MCNC: 4.3 G/DL (ref 2.8–4.4)
GLUCOSE BLD-MCNC: 82 MG/DL (ref 70–99)
HCT VFR BLD AUTO: 34.4 % (ref 35–48)
HGB BLD-MCNC: 11.5 G/DL (ref 12–16)
IMM GRANULOCYTES # BLD AUTO: 0.06 X10(3) UL (ref 0–1)
IMM GRANULOCYTES NFR BLD: 0.7 %
LYMPHOCYTES # BLD AUTO: 1.63 X10(3) UL (ref 1–4)
LYMPHOCYTES NFR BLD AUTO: 20.1 %
M PROTEIN MFR SERPL ELPH: 6.9 G/DL (ref 6.4–8.2)
MCH RBC QN AUTO: 29.4 PG (ref 26–34)
MCHC RBC AUTO-ENTMCNC: 33.4 G/DL (ref 31–37)
MCV RBC AUTO: 88 FL (ref 80–100)
MONOCYTES # BLD AUTO: 0.58 X10(3) UL (ref 0.1–1)
MONOCYTES NFR BLD AUTO: 7.2 %
MULTISTIX LOT#: 1044 NUMERIC
NEUTROPHILS # BLD AUTO: 5.65 X10 (3) UL (ref 1.5–7.7)
NEUTROPHILS # BLD AUTO: 5.65 X10(3) UL (ref 1.5–7.7)
NEUTROPHILS NFR BLD AUTO: 69.7 %
OSMOLALITY SERPL CALC.SUM OF ELEC: 286 MOSM/KG (ref 275–295)
PH, URINE: 8 (ref 4.5–8)
PLATELET # BLD AUTO: 212 10(3)UL (ref 150–450)
POTASSIUM SERPL-SCNC: 3.9 MMOL/L (ref 3.5–5.1)
PROT UR-MCNC: 16.5 MG/DL
PROT/CREAT UR-RTO: 0.19
RBC # BLD AUTO: 3.91 X10(6)UL (ref 3.8–5.3)
SODIUM SERPL-SCNC: 139 MMOL/L (ref 136–145)
SPECIFIC GRAVITY: 1.01 (ref 1–1.03)
URINE-COLOR: YELLOW
UROBILINOGEN,SEMI-QN: 0.2 MG/DL (ref 0–1.9)
WBC # BLD AUTO: 8.1 X10(3) UL (ref 4–11)

## 2020-08-28 PROCEDURE — 3077F SYST BP >= 140 MM HG: CPT | Performed by: ADVANCED PRACTICE MIDWIFE

## 2020-08-28 PROCEDURE — 59025 FETAL NON-STRESS TEST: CPT | Performed by: ADVANCED PRACTICE MIDWIFE

## 2020-08-28 PROCEDURE — 81002 URINALYSIS NONAUTO W/O SCOPE: CPT | Performed by: ADVANCED PRACTICE MIDWIFE

## 2020-08-28 PROCEDURE — 3008F BODY MASS INDEX DOCD: CPT | Performed by: ADVANCED PRACTICE MIDWIFE

## 2020-08-28 PROCEDURE — 0502F SUBSEQUENT PRENATAL CARE: CPT | Performed by: ADVANCED PRACTICE MIDWIFE

## 2020-08-28 PROCEDURE — 3079F DIAST BP 80-89 MM HG: CPT | Performed by: ADVANCED PRACTICE MIDWIFE

## 2020-08-28 NOTE — PROGRESS NOTES
Pt is a 30year old female admitted to TR1/TR1-A. Patient presents with:  R/o Pih: high b/p in office, HA x 5 days, swelling in feets, hands, and eyelids     Pt is  32w0d intra-uterine pregnancy. History obtained, consents signed.  Oriented to [employer]

## 2020-08-28 NOTE — TRIAGE
[facility]      Triage Note    [address] Patient Status:  Observation    3/15/1985 MRN [id number]   Location [address] Attending Madison Guillen, [address] Day # 0 PCP No primary care provider on file. Stimulator: No           Nonstress Test Interpretation: Reactive           Nonstress Test Second Interpretation: Reactive          FHR Category: Category I           Additional Comments       Reason for visit:  at 32 0/7 weeks presented to triage from

## 2020-08-31 ENCOUNTER — ROUTINE PRENATAL (OUTPATIENT)
Dept: OBGYN CLINIC | Facility: CLINIC | Age: 35
End: 2020-08-31
Payer: MEDICAID

## 2020-08-31 VITALS
WEIGHT: 174.5 LBS | DIASTOLIC BLOOD PRESSURE: 74 MMHG | HEIGHT: 62 IN | SYSTOLIC BLOOD PRESSURE: 110 MMHG | HEART RATE: 91 BPM | BODY MASS INDEX: 32.11 KG/M2

## 2020-08-31 DIAGNOSIS — Z34.83 PRENATAL CARE, SUBSEQUENT PREGNANCY, THIRD TRIMESTER: Primary | ICD-10-CM

## 2020-08-31 LAB
MULTISTIX LOT#: 1044 NUMERIC
PH, URINE: 7 (ref 4.5–8)
SPECIFIC GRAVITY: 1.01 (ref 1–1.03)
URINE-COLOR: YELLOW
UROBILINOGEN,SEMI-QN: 0.2 MG/DL (ref 0–1.9)

## 2020-08-31 PROCEDURE — 81002 URINALYSIS NONAUTO W/O SCOPE: CPT | Performed by: ADVANCED PRACTICE MIDWIFE

## 2020-08-31 PROCEDURE — 3008F BODY MASS INDEX DOCD: CPT | Performed by: ADVANCED PRACTICE MIDWIFE

## 2020-08-31 PROCEDURE — 3074F SYST BP LT 130 MM HG: CPT | Performed by: ADVANCED PRACTICE MIDWIFE

## 2020-08-31 PROCEDURE — 3078F DIAST BP <80 MM HG: CPT | Performed by: ADVANCED PRACTICE MIDWIFE

## 2020-08-31 PROCEDURE — 0502F SUBSEQUENT PRENATAL CARE: CPT | Performed by: ADVANCED PRACTICE MIDWIFE

## 2020-08-31 NOTE — PROGRESS NOTES
Feeling better. Reports a few headaches over the weekend but less intense than previously and resolved with tylenol, denies headache today and reports swelling has decreased. Denies visual changes or abdominal pain. Reports active baby, denies s/s PTL.

## 2020-09-08 ENCOUNTER — ROUTINE PRENATAL (OUTPATIENT)
Dept: OBGYN CLINIC | Facility: CLINIC | Age: 35
End: 2020-09-08
Payer: MEDICAID

## 2020-09-08 VITALS
SYSTOLIC BLOOD PRESSURE: 111 MMHG | DIASTOLIC BLOOD PRESSURE: 74 MMHG | WEIGHT: 175.63 LBS | HEART RATE: 96 BPM | BODY MASS INDEX: 32 KG/M2

## 2020-09-08 DIAGNOSIS — Z34.83 ENCOUNTER FOR SUPERVISION OF OTHER NORMAL PREGNANCY IN THIRD TRIMESTER: Primary | ICD-10-CM

## 2020-09-08 LAB
MULTISTIX LOT#: 1044 NUMERIC
PH, URINE: 8 (ref 4.5–8)
SPECIFIC GRAVITY: 1.01 (ref 1–1.03)
URINE-COLOR: YELLOW
UROBILINOGEN,SEMI-QN: 0.2 MG/DL (ref 0–1.9)

## 2020-09-08 PROCEDURE — 3078F DIAST BP <80 MM HG: CPT | Performed by: ADVANCED PRACTICE MIDWIFE

## 2020-09-08 PROCEDURE — 3074F SYST BP LT 130 MM HG: CPT | Performed by: ADVANCED PRACTICE MIDWIFE

## 2020-09-08 PROCEDURE — 0502F SUBSEQUENT PRENATAL CARE: CPT | Performed by: ADVANCED PRACTICE MIDWIFE

## 2020-09-08 PROCEDURE — 81002 URINALYSIS NONAUTO W/O SCOPE: CPT | Performed by: ADVANCED PRACTICE MIDWIFE

## 2020-09-08 NOTE — PROGRESS NOTES
Siobhan Aviles is here for her BRAD visit. Currently, she is feeling well. Denies 3rd trimester danger signs. Still has swelling of hands but improved and no pain.     Vital signs and weight reviewed  See flowsheets     Today's Assessment/Plan: Care is up to date

## 2020-09-21 ENCOUNTER — ROUTINE PRENATAL (OUTPATIENT)
Dept: OBGYN CLINIC | Facility: CLINIC | Age: 35
End: 2020-09-21
Payer: MEDICAID

## 2020-09-21 VITALS
SYSTOLIC BLOOD PRESSURE: 122 MMHG | WEIGHT: 180 LBS | DIASTOLIC BLOOD PRESSURE: 78 MMHG | BODY MASS INDEX: 33.13 KG/M2 | HEART RATE: 114 BPM | HEIGHT: 62 IN

## 2020-09-21 DIAGNOSIS — Z34.83 PRENATAL CARE, SUBSEQUENT PREGNANCY, THIRD TRIMESTER: Primary | ICD-10-CM

## 2020-09-21 LAB
APPEARANCE: CLEAR
MULTISTIX LOT#: 1044 NUMERIC
PH, URINE: 7 (ref 4.5–8)
SPECIFIC GRAVITY: 1.02 (ref 1–1.03)
URINE-COLOR: YELLOW
UROBILINOGEN,SEMI-QN: 0.2 MG/DL (ref 0–1.9)

## 2020-09-21 PROCEDURE — 3008F BODY MASS INDEX DOCD: CPT | Performed by: ADVANCED PRACTICE MIDWIFE

## 2020-09-21 PROCEDURE — 90686 IIV4 VACC NO PRSV 0.5 ML IM: CPT | Performed by: ADVANCED PRACTICE MIDWIFE

## 2020-09-21 PROCEDURE — 81002 URINALYSIS NONAUTO W/O SCOPE: CPT | Performed by: ADVANCED PRACTICE MIDWIFE

## 2020-09-21 PROCEDURE — 3074F SYST BP LT 130 MM HG: CPT | Performed by: ADVANCED PRACTICE MIDWIFE

## 2020-09-21 PROCEDURE — 3078F DIAST BP <80 MM HG: CPT | Performed by: ADVANCED PRACTICE MIDWIFE

## 2020-09-21 PROCEDURE — 1111F DSCHRG MED/CURRENT MED MERGE: CPT | Performed by: ADVANCED PRACTICE MIDWIFE

## 2020-09-21 PROCEDURE — 90471 IMMUNIZATION ADMIN: CPT | Performed by: ADVANCED PRACTICE MIDWIFE

## 2020-09-21 PROCEDURE — 0502F SUBSEQUENT PRENATAL CARE: CPT | Performed by: ADVANCED PRACTICE MIDWIFE

## 2020-09-21 NOTE — PROGRESS NOTES
Pt c/o daily heartburn, taking 2-3 tums per day. Reviewed s/s preeclampsia - no c/o HA, RUQ pain, visual changes. Taking vaginal progesterone. Plan for cerclage to be removed at 36 6/7. GBS swab done today due to risk of  labor.  Flu vaccine given

## 2020-09-28 ENCOUNTER — ROUTINE PRENATAL (OUTPATIENT)
Dept: OBGYN CLINIC | Facility: CLINIC | Age: 35
End: 2020-09-28
Payer: MEDICAID

## 2020-09-28 VITALS
HEART RATE: 98 BPM | DIASTOLIC BLOOD PRESSURE: 70 MMHG | SYSTOLIC BLOOD PRESSURE: 113 MMHG | HEIGHT: 62 IN | BODY MASS INDEX: 33.51 KG/M2 | WEIGHT: 182.13 LBS

## 2020-09-28 DIAGNOSIS — Z34.83 PRENATAL CARE, SUBSEQUENT PREGNANCY, THIRD TRIMESTER: Primary | ICD-10-CM

## 2020-09-28 LAB
APPEARANCE: CLEAR
MULTISTIX LOT#: 1044 NUMERIC
PH, URINE: 5 (ref 4.5–8)
SPECIFIC GRAVITY: 1 (ref 1–1.03)
URINE-COLOR: YELLOW
UROBILINOGEN,SEMI-QN: 0 MG/DL (ref 0–1.9)

## 2020-09-28 PROCEDURE — 0502F SUBSEQUENT PRENATAL CARE: CPT | Performed by: ADVANCED PRACTICE MIDWIFE

## 2020-09-28 PROCEDURE — 3078F DIAST BP <80 MM HG: CPT | Performed by: ADVANCED PRACTICE MIDWIFE

## 2020-09-28 PROCEDURE — 81002 URINALYSIS NONAUTO W/O SCOPE: CPT | Performed by: ADVANCED PRACTICE MIDWIFE

## 2020-09-28 PROCEDURE — 3074F SYST BP LT 130 MM HG: CPT | Performed by: ADVANCED PRACTICE MIDWIFE

## 2020-09-28 PROCEDURE — 3008F BODY MASS INDEX DOCD: CPT | Performed by: ADVANCED PRACTICE MIDWIFE

## 2020-09-28 NOTE — PROGRESS NOTES
Active baby. Cerclage removal this week - Will stop progesterone as well. Reviewed danger signs. Planning routine  procedures. Open for pain management.

## 2020-10-01 ENCOUNTER — OFFICE VISIT (OUTPATIENT)
Dept: OBGYN CLINIC | Facility: CLINIC | Age: 35
End: 2020-10-01
Payer: MEDICAID

## 2020-10-01 VITALS — WEIGHT: 181 LBS | DIASTOLIC BLOOD PRESSURE: 85 MMHG | BODY MASS INDEX: 33 KG/M2 | SYSTOLIC BLOOD PRESSURE: 131 MMHG

## 2020-10-01 DIAGNOSIS — O34.33 CERVICAL CERCLAGE SUTURE PRESENT IN THIRD TRIMESTER: ICD-10-CM

## 2020-10-01 DIAGNOSIS — Z34.83 ENCOUNTER FOR SUPERVISION OF OTHER NORMAL PREGNANCY IN THIRD TRIMESTER: ICD-10-CM

## 2020-10-01 DIAGNOSIS — O26.879 SHORT CERVIX AFFECTING PREGNANCY: Primary | ICD-10-CM

## 2020-10-01 PROCEDURE — 3075F SYST BP GE 130 - 139MM HG: CPT | Performed by: OBSTETRICS & GYNECOLOGY

## 2020-10-01 PROCEDURE — 3079F DIAST BP 80-89 MM HG: CPT | Performed by: OBSTETRICS & GYNECOLOGY

## 2020-10-01 PROCEDURE — 99213 OFFICE O/P EST LOW 20 MIN: CPT | Performed by: OBSTETRICS & GYNECOLOGY

## 2020-10-03 NOTE — PROCEDURES
Procedure   Patient counseled on risks of cerclage removal.  Discussed side effects that include bleeding. Consent was signed. A speculum was placed in the vagina and the cerclage was visualized.   The cerclage knot was elevated and one side of the cerc

## 2020-10-05 ENCOUNTER — ROUTINE PRENATAL (OUTPATIENT)
Dept: OBGYN CLINIC | Facility: CLINIC | Age: 35
End: 2020-10-05
Payer: MEDICAID

## 2020-10-05 VITALS
BODY MASS INDEX: 33 KG/M2 | DIASTOLIC BLOOD PRESSURE: 73 MMHG | SYSTOLIC BLOOD PRESSURE: 115 MMHG | HEART RATE: 105 BPM | WEIGHT: 181.38 LBS

## 2020-10-05 DIAGNOSIS — Z34.93 ENCOUNTER FOR SUPERVISION OF NORMAL PREGNANCY IN THIRD TRIMESTER, UNSPECIFIED GRAVIDITY: Primary | ICD-10-CM

## 2020-10-05 PROCEDURE — 0502F SUBSEQUENT PRENATAL CARE: CPT | Performed by: ADVANCED PRACTICE MIDWIFE

## 2020-10-05 PROCEDURE — 3078F DIAST BP <80 MM HG: CPT | Performed by: ADVANCED PRACTICE MIDWIFE

## 2020-10-05 PROCEDURE — 81002 URINALYSIS NONAUTO W/O SCOPE: CPT | Performed by: ADVANCED PRACTICE MIDWIFE

## 2020-10-05 PROCEDURE — 3074F SYST BP LT 130 MM HG: CPT | Performed by: ADVANCED PRACTICE MIDWIFE

## 2020-10-05 NOTE — PROGRESS NOTES
Hands are slightly tingling - advised to reduce salt intake and increase hydration. Reviewed danger signs. Cerclage is out.

## 2020-10-14 ENCOUNTER — ROUTINE PRENATAL (OUTPATIENT)
Dept: OBGYN CLINIC | Facility: CLINIC | Age: 35
End: 2020-10-14
Payer: MEDICAID

## 2020-10-14 VITALS
HEIGHT: 62 IN | SYSTOLIC BLOOD PRESSURE: 121 MMHG | DIASTOLIC BLOOD PRESSURE: 73 MMHG | HEART RATE: 99 BPM | WEIGHT: 184.25 LBS | BODY MASS INDEX: 33.9 KG/M2

## 2020-10-14 DIAGNOSIS — Z34.83 PRENATAL CARE, SUBSEQUENT PREGNANCY, THIRD TRIMESTER: Primary | ICD-10-CM

## 2020-10-14 PROBLEM — Z33.1 IUP (INTRAUTERINE PREGNANCY), INCIDENTAL: Status: RESOLVED | Noted: 2020-06-09 | Resolved: 2020-10-14

## 2020-10-14 PROBLEM — Z34.90 PREGNANCY: Status: RESOLVED | Noted: 2020-08-28 | Resolved: 2020-10-14

## 2020-10-14 PROBLEM — Z34.93 PRENATAL CARE, THIRD TRIMESTER: Status: ACTIVE | Noted: 2020-10-14

## 2020-10-14 PROCEDURE — 81002 URINALYSIS NONAUTO W/O SCOPE: CPT | Performed by: ADVANCED PRACTICE MIDWIFE

## 2020-10-14 PROCEDURE — 0502F SUBSEQUENT PRENATAL CARE: CPT | Performed by: ADVANCED PRACTICE MIDWIFE

## 2020-10-14 PROCEDURE — 3078F DIAST BP <80 MM HG: CPT | Performed by: ADVANCED PRACTICE MIDWIFE

## 2020-10-14 PROCEDURE — 3008F BODY MASS INDEX DOCD: CPT | Performed by: ADVANCED PRACTICE MIDWIFE

## 2020-10-14 PROCEDURE — 3074F SYST BP LT 130 MM HG: CPT | Performed by: ADVANCED PRACTICE MIDWIFE

## 2020-10-14 RX ORDER — BREAST PUMP
EACH MISCELLANEOUS
Qty: 1 EACH | Refills: 0 | Status: SHIPPED | OUTPATIENT
Start: 2020-10-14 | End: 2020-12-15

## 2020-10-14 NOTE — PATIENT INSTRUCTIONS
Understanding Preeclampsia  Preeclampsia is high blood pressure (hypertension) that happens during pregnancy. It often shows up around the 20th week of pregnancy. It often goes back to normal by the 12th week after you give birth.  It can lead to serious Call your healthcare provider if swelling, weight gain, or other symptoms come on quickly or are severe. Some cases of preeclampsia are more severe than others. Your symptoms also may change or get worse as you get closer to your due date. Who’s at risk? Postpartum preeclampsia that develops within the first 48 hours after delivery is rare. Another type of postpartum preeclampsia that develops more than 48 hours after delivery is called late-onset preeclampsia. It is also rare.  Contact your healthcare prov · You have not felt your baby move all day. Priyank last reviewed this educational content on 12/1/2017  © 8084-2119 [employer]. [address] [facility], [address] [place] [address]. All rights reserved.  This information is not intended as a substitu Priyank last reviewed this educational content on 10/1/2017  © 5305-5774 [employer]. [address] [facility], [facility] [place]. All rights reserved. This information is not intended as a substitute for professional medical care.  Always foll The third stage of labor comes after your baby is born. This is when the afterbirth (placenta) comes out of your uterus. Your uterus will continue to contract. But the contractions are much milder than before.   Priyank last reviewed this educational sadia

## 2020-10-21 ENCOUNTER — ROUTINE PRENATAL (OUTPATIENT)
Dept: OBGYN CLINIC | Facility: CLINIC | Age: 35
End: 2020-10-21
Payer: MEDICAID

## 2020-10-21 ENCOUNTER — HOSPITAL ENCOUNTER (OUTPATIENT)
Facility: HOSPITAL | Age: 35
Setting detail: OBSERVATION
Discharge: HOME | End: 2020-10-21
Attending: ADVANCED PRACTICE MIDWIFE | Admitting: OBSTETRICS & GYNECOLOGY
Payer: MEDICAID

## 2020-10-21 VITALS
DIASTOLIC BLOOD PRESSURE: 87 MMHG | HEIGHT: 62 IN | WEIGHT: 183 LBS | HEART RATE: 85 BPM | BODY MASS INDEX: 33.68 KG/M2 | SYSTOLIC BLOOD PRESSURE: 136 MMHG

## 2020-10-21 VITALS — SYSTOLIC BLOOD PRESSURE: 132 MMHG | DIASTOLIC BLOOD PRESSURE: 81 MMHG | HEART RATE: 76 BPM

## 2020-10-21 DIAGNOSIS — Z34.83 PRENATAL CARE, SUBSEQUENT PREGNANCY, THIRD TRIMESTER: Primary | ICD-10-CM

## 2020-10-21 PROBLEM — O36.8190 DECREASED FETAL MOVEMENT: Status: ACTIVE | Noted: 2020-10-21

## 2020-10-21 PROCEDURE — 59025 FETAL NON-STRESS TEST: CPT | Performed by: ADVANCED PRACTICE MIDWIFE

## 2020-10-21 PROCEDURE — 0502F SUBSEQUENT PRENATAL CARE: CPT | Performed by: ADVANCED PRACTICE MIDWIFE

## 2020-10-21 PROCEDURE — 3079F DIAST BP 80-89 MM HG: CPT | Performed by: ADVANCED PRACTICE MIDWIFE

## 2020-10-21 PROCEDURE — 3008F BODY MASS INDEX DOCD: CPT | Performed by: ADVANCED PRACTICE MIDWIFE

## 2020-10-21 PROCEDURE — 81002 URINALYSIS NONAUTO W/O SCOPE: CPT | Performed by: ADVANCED PRACTICE MIDWIFE

## 2020-10-21 PROCEDURE — 3075F SYST BP GE 130 - 139MM HG: CPT | Performed by: ADVANCED PRACTICE MIDWIFE

## 2020-10-21 NOTE — TRIAGE
[place] [facility] - [facility]      Triage Note    Fadia Buddle Patient Status:  Observation    3/15/1985 MRN [id number]   Location [facility] Attending Stephan Underwood, [address] Day # 0 PCP No primary care provider on александр evaluated for c/o decreased fetal movement. PO fluids given. NST reactive with no contractions noted. Sherly Llamas CNM  informed. Discharge order received. Kick count, labor precaution and pre-induction instructions reviewed with pt.  Pt states under

## 2020-10-21 NOTE — PROGRESS NOTES
Reports decreased FM - to triage for [facility] contractions. Pt reports increased vaginal discharge x 1 week  No odor  Sterile spec exam -no pooling leukorrhea present Nitrazine negative.   Pt desires IOL  IOL scheduled for SaturdayReviewed S&S labor  Warning

## 2020-10-21 NOTE — PROGRESS NOTES
Pt is a 30year old female admitted to TR5/TR5-A. Patient presents with:  Decreased Fetal Movement     Pt is  39w5d intra-uterine pregnancy. History obtained, consents signed. Oriented to room, staff, and plan of care.

## 2020-10-23 ENCOUNTER — TELEPHONE (OUTPATIENT)
Dept: OBGYN UNIT | Facility: HOSPITAL | Age: 35
End: 2020-10-23

## 2020-10-24 ENCOUNTER — ANESTHESIA EVENT (OUTPATIENT)
Dept: OBGYN UNIT | Facility: HOSPITAL | Age: 35
End: 2020-10-24
Payer: MEDICAID

## 2020-10-24 ENCOUNTER — ANESTHESIA (OUTPATIENT)
Dept: OBGYN UNIT | Facility: HOSPITAL | Age: 35
End: 2020-10-24
Payer: MEDICAID

## 2020-10-24 ENCOUNTER — HOSPITAL ENCOUNTER (INPATIENT)
Facility: HOSPITAL | Age: 35
LOS: 3 days | Discharge: HOME | End: 2020-10-27
Attending: ADVANCED PRACTICE MIDWIFE | Admitting: OBSTETRICS & GYNECOLOGY
Payer: MEDICAID

## 2020-10-24 ENCOUNTER — APPOINTMENT (OUTPATIENT)
Dept: OBGYN CLINIC | Facility: HOSPITAL | Age: 35
End: 2020-10-24
Attending: ADVANCED PRACTICE MIDWIFE
Payer: MEDICAID

## 2020-10-24 PROBLEM — O48.0 POST-TERM PREGNANCY, 40-42 WEEKS OF GESTATION: Status: ACTIVE | Noted: 2020-10-24

## 2020-10-24 PROBLEM — Z34.90 PREGNANCY: Status: ACTIVE | Noted: 2020-10-24

## 2020-10-24 PROCEDURE — 3E033VJ INTRODUCTION OF OTHER HORMONE INTO PERIPHERAL VEIN, PERCUTANEOUS APPROACH: ICD-10-PCS | Performed by: ADVANCED PRACTICE MIDWIFE

## 2020-10-24 RX ORDER — TRANEXAMIC ACID 10 MG/ML
INJECTION, SOLUTION INTRAVENOUS
Status: DISCONTINUED
Start: 2020-10-24 | End: 2020-10-25 | Stop reason: WASHOUT

## 2020-10-24 RX ORDER — TERBUTALINE SULFATE 1 MG/ML
0.25 INJECTION, SOLUTION SUBCUTANEOUS AS NEEDED
Status: DISCONTINUED | OUTPATIENT
Start: 2020-10-24 | End: 2020-10-25 | Stop reason: HOSPADM

## 2020-10-24 RX ORDER — CARBOPROST TROMETHAMINE 250 UG/ML
INJECTION, SOLUTION INTRAMUSCULAR
Status: DISCONTINUED
Start: 2020-10-24 | End: 2020-10-25 | Stop reason: WASHOUT

## 2020-10-24 RX ORDER — CALCIUM CARBONATE 200(500)MG
500 TABLET,CHEWABLE ORAL
Status: DISCONTINUED | OUTPATIENT
Start: 2020-10-24 | End: 2020-10-25

## 2020-10-24 RX ORDER — AMMONIA INHALANTS 0.04 G/.3ML
0.3 INHALANT RESPIRATORY (INHALATION) AS NEEDED
Status: DISCONTINUED | OUTPATIENT
Start: 2020-10-24 | End: 2020-10-25 | Stop reason: HOSPADM

## 2020-10-24 RX ORDER — ACETAMINOPHEN 500 MG
500 TABLET ORAL EVERY 6 HOURS PRN
Status: DISCONTINUED | OUTPATIENT
Start: 2020-10-24 | End: 2020-10-25 | Stop reason: HOSPADM

## 2020-10-24 RX ORDER — TRISODIUM CITRATE DIHYDRATE AND CITRIC ACID MONOHYDRATE 500; 334 MG/5ML; MG/5ML
30 SOLUTION ORAL AS NEEDED
Status: DISCONTINUED | OUTPATIENT
Start: 2020-10-24 | End: 2020-10-25 | Stop reason: HOSPADM

## 2020-10-24 RX ORDER — LIDOCAINE HYDROCHLORIDE 10 MG/ML
30 INJECTION, SOLUTION EPIDURAL; INFILTRATION; INTRACAUDAL; PERINEURAL ONCE
Status: DISCONTINUED | OUTPATIENT
Start: 2020-10-24 | End: 2020-10-25 | Stop reason: HOSPADM

## 2020-10-24 RX ORDER — MISOPROSTOL 200 UG/1
TABLET ORAL
Status: DISCONTINUED
Start: 2020-10-24 | End: 2020-10-25 | Stop reason: WASHOUT

## 2020-10-24 RX ORDER — LIDOCAINE HYDROCHLORIDE AND EPINEPHRINE 15; 5 MG/ML; UG/ML
INJECTION, SOLUTION EPIDURAL AS NEEDED
Status: DISCONTINUED | OUTPATIENT
Start: 2020-10-24 | End: 2020-10-25 | Stop reason: SURG

## 2020-10-24 RX ORDER — IBUPROFEN 600 MG/1
600 TABLET ORAL EVERY 6 HOURS PRN
Status: DISCONTINUED | OUTPATIENT
Start: 2020-10-24 | End: 2020-10-25 | Stop reason: HOSPADM

## 2020-10-24 RX ORDER — ONDANSETRON 2 MG/ML
4 INJECTION INTRAMUSCULAR; INTRAVENOUS EVERY 6 HOURS PRN
Status: DISCONTINUED | OUTPATIENT
Start: 2020-10-24 | End: 2020-10-25 | Stop reason: HOSPADM

## 2020-10-24 RX ORDER — DIPHENHYDRAMINE HYDROCHLORIDE 50 MG/ML
12.5 INJECTION INTRAMUSCULAR; INTRAVENOUS EVERY 4 HOURS PRN
Status: DISCONTINUED | OUTPATIENT
Start: 2020-10-24 | End: 2020-10-25

## 2020-10-24 RX ORDER — BUPIVACAINE HCL/0.9 % NACL/PF 0.25 %
5 PLASTIC BAG, INJECTION (ML) EPIDURAL AS NEEDED
Status: DISCONTINUED | OUTPATIENT
Start: 2020-10-24 | End: 2020-10-25

## 2020-10-24 RX ORDER — SODIUM CHLORIDE, SODIUM LACTATE, POTASSIUM CHLORIDE, CALCIUM CHLORIDE 600; 310; 30; 20 MG/100ML; MG/100ML; MG/100ML; MG/100ML
INJECTION, SOLUTION INTRAVENOUS CONTINUOUS
Status: DISCONTINUED | OUTPATIENT
Start: 2020-10-24 | End: 2020-10-25

## 2020-10-24 RX ORDER — DIPHENHYDRAMINE HYDROCHLORIDE 50 MG/ML
INJECTION INTRAMUSCULAR; INTRAVENOUS
Status: COMPLETED
Start: 2020-10-24 | End: 2020-10-24

## 2020-10-24 RX ORDER — LIDOCAINE HYDROCHLORIDE 10 MG/ML
INJECTION, SOLUTION EPIDURAL; INFILTRATION; INTRACAUDAL; PERINEURAL AS NEEDED
Status: DISCONTINUED | OUTPATIENT
Start: 2020-10-24 | End: 2020-10-25 | Stop reason: SURG

## 2020-10-24 RX ORDER — DIPHENHYDRAMINE HYDROCHLORIDE 50 MG/ML
50 INJECTION INTRAMUSCULAR; INTRAVENOUS ONCE
Status: COMPLETED | OUTPATIENT
Start: 2020-10-24 | End: 2020-10-24

## 2020-10-24 RX ORDER — BUPIVACAINE HYDROCHLORIDE 2.5 MG/ML
30 INJECTION, SOLUTION EPIDURAL; INFILTRATION; INTRACAUDAL ONCE
Status: COMPLETED | OUTPATIENT
Start: 2020-10-24 | End: 2020-10-24

## 2020-10-24 RX ORDER — METHYLERGONOVINE MALEATE 0.2 MG/ML
INJECTION INTRAVENOUS
Status: DISCONTINUED
Start: 2020-10-24 | End: 2020-10-25 | Stop reason: WASHOUT

## 2020-10-24 RX ADMIN — BUPIVACAINE HYDROCHLORIDE 5 ML: 2.5 INJECTION, SOLUTION EPIDURAL; INFILTRATION; INTRACAUDAL at 23:27:00

## 2020-10-24 RX ADMIN — BUPIVACAINE HYDROCHLORIDE 5 ML: 2.5 INJECTION, SOLUTION EPIDURAL; INFILTRATION; INTRACAUDAL at 21:24:00

## 2020-10-24 RX ADMIN — LIDOCAINE HYDROCHLORIDE 3 ML: 10 INJECTION, SOLUTION EPIDURAL; INFILTRATION; INTRACAUDAL; PERINEURAL at 14:27:00

## 2020-10-24 RX ADMIN — LIDOCAINE HYDROCHLORIDE AND EPINEPHRINE 5 ML: 15; 5 INJECTION, SOLUTION EPIDURAL at 14:30:00

## 2020-10-24 NOTE — ANESTHESIA PREPROCEDURE EVALUATION
Anesthesia PreOp Note    HPI:     Demetrius Navarro is a 30year old female who presents for preoperative consultation requested by: * No surgeons listed *    Date of Surgery: 10/24/2020    * No procedures listed *  Indication: * No pre-op diagnosis entered * Citrate-Citric Acid (BICITRA) solution 30 mL, 30 mL, Oral, PRN, Supriya Ornelas CNM    •  ondansetron HCl (ZOFRAN) injection 4 mg, 4 mg, Intravenous, Q6H PRN, Supriya Ornelas CNM    •  Ammonia Aromatic (ammonia) nasal solution 0.3 mL, 0.3 mL, Nasal, PRN, Ritu Used    Substance and Sexual Activity      Alcohol use: Not Currently        Frequency: Monthly or less      Drug use: Never      Sexual activity: Not on file    Lifestyle      Physical activity        Days per week: Not on file        Minutes per session: (37.1 °C). Her blood pressure is 126/70 and her pulse is 84. Her oxygen saturation is 100%.     10/24/20  1431 10/24/20  1432 10/24/20  1434 10/24/20  1436   BP:  132/88 122/66 126/70   Pulse: 82 82 91 84   Temp:       TempSrc:       SpO2: 100% 100%  100%

## 2020-10-24 NOTE — PROGRESS NOTES
Pt is a 30year old female admitted to [address] [place] [address]. No chief complaint on file. Pt is  40w1d intra-uterine pregnancy. History obtained, consents signed. Oriented to room, staff, and plan of care.

## 2020-10-24 NOTE — ANESTHESIA PROCEDURE NOTES
Labor Analgesia  Performed by: Alem May MD  Authorized by: Alem May MD       General Information and Staff    Start Time:  10/24/2020 2:25 PM  End Time:  10/24/2020 2:35 PM  Anesthesiologist:  Alem May MD  Performed by:   Anesthesiologi

## 2020-10-24 NOTE — H&P
[address] Patient Status:  Inpatient    3/15/1985 MRN [id number]   Location [address] [place] [address] Attending [facility] Day # 0 Allen Andino MD     Date of •  Progesterone (ENDOMETRIN) 100 MG Vaginal Insert, Place 2 tablets (200 mg total) vaginally nightly.  (Patient not taking: Reported on 10/5/2020 ), Disp: 60 tablet, Rfl: 5    •  Prenatal Vit-Fe Fumarate-FA ( PRENATAL VITAMINS) 28-0.8 MG Oral Tab, Take by 13.1 g/dL 12.0 - 16.0 03/09/20 1247    MCV 89.3 fL 80.0 - 100.0 06/23/20 1055      91.9 fL 80.0 - 100.0 03/09/20 1247    Platelets 138.5 99(0).0 - 450.0 06/23/20 1055      219.0 10(3)uL 150.0 - 450.0 03/09/20 1247    Rubella Positive  Positive 03/ GC DNA Negative  Negative 07/14/20 1231    Chlamydia DNA Negative  Negative 07/14/20 1231          35-37 Weeks     Test Value Reference Range Date Time    HCT 33.4 % 35.0 - 48.0 10/24/20 0843    HGB 11.3 g/dL 12.0 - 16.0 10/24/20 0843    Platelets 443.4 1

## 2020-10-25 PROCEDURE — 59409 OBSTETRICAL CARE: CPT | Performed by: ADVANCED PRACTICE MIDWIFE

## 2020-10-25 PROCEDURE — 0KQM0ZZ REPAIR PERINEUM MUSCLE, OPEN APPROACH: ICD-10-PCS | Performed by: ADVANCED PRACTICE MIDWIFE

## 2020-10-25 RX ORDER — AMMONIA INHALANTS 0.04 G/.3ML
0.3 INHALANT RESPIRATORY (INHALATION) AS NEEDED
Status: DISCONTINUED | OUTPATIENT
Start: 2020-10-25 | End: 2020-10-27

## 2020-10-25 RX ORDER — IBUPROFEN 600 MG/1
600 TABLET ORAL EVERY 6 HOURS
Status: DISCONTINUED | OUTPATIENT
Start: 2020-10-25 | End: 2020-10-27

## 2020-10-25 RX ORDER — ONDANSETRON 2 MG/ML
4 INJECTION INTRAMUSCULAR; INTRAVENOUS EVERY 6 HOURS PRN
Status: DISCONTINUED | OUTPATIENT
Start: 2020-10-25 | End: 2020-10-27

## 2020-10-25 RX ORDER — DIAPER,BRIEF,INFANT-TODD,DISP
1 EACH MISCELLANEOUS EVERY 6 HOURS PRN
Status: DISCONTINUED | OUTPATIENT
Start: 2020-10-25 | End: 2020-10-27

## 2020-10-25 RX ORDER — CHOLECALCIFEROL (VITAMIN D3) 25 MCG
1 TABLET,CHEWABLE ORAL DAILY
Status: DISCONTINUED | OUTPATIENT
Start: 2020-10-25 | End: 2020-10-27

## 2020-10-25 RX ORDER — PHYTONADIONE 1 MG/.5ML
1 INJECTION, EMULSION INTRAMUSCULAR; INTRAVENOUS; SUBCUTANEOUS ONCE
Status: DISCONTINUED | OUTPATIENT
Start: 2020-10-25 | End: 2020-10-25

## 2020-10-25 RX ORDER — SIMETHICONE 80 MG
80 TABLET,CHEWABLE ORAL 3 TIMES DAILY PRN
Status: DISCONTINUED | OUTPATIENT
Start: 2020-10-25 | End: 2020-10-27

## 2020-10-25 RX ORDER — NICOTINE POLACRILEX 4 MG
0.5 LOZENGE BUCCAL AS NEEDED
Status: DISCONTINUED | OUTPATIENT
Start: 2020-10-25 | End: 2020-10-25 | Stop reason: CLARIF

## 2020-10-25 RX ORDER — ERYTHROMYCIN 5 MG/G
1 OINTMENT OPHTHALMIC ONCE
Status: DISCONTINUED | OUTPATIENT
Start: 2020-10-25 | End: 2020-10-25

## 2020-10-25 RX ORDER — ACETAMINOPHEN 325 MG/1
650 TABLET ORAL EVERY 6 HOURS PRN
Status: DISCONTINUED | OUTPATIENT
Start: 2020-10-25 | End: 2020-10-27

## 2020-10-25 RX ORDER — DOCUSATE SODIUM 100 MG/1
100 CAPSULE, LIQUID FILLED ORAL
Status: DISCONTINUED | OUTPATIENT
Start: 2020-10-25 | End: 2020-10-27

## 2020-10-25 RX ORDER — BISACODYL 10 MG
10 SUPPOSITORY, RECTAL RECTAL ONCE AS NEEDED
Status: DISCONTINUED | OUTPATIENT
Start: 2020-10-25 | End: 2020-10-27

## 2020-10-25 NOTE — L&D DELIVERY NOTE
[facility]    Vaginal Delivery Note    Azucena Ferrell Patient Status:  Inpatient    3/15/1985 MRN [id number]   Location [address] [place] [address] Attending [facility], [address] Day # 1 PCP No primary care provider on александр

## 2020-10-25 NOTE — PROGRESS NOTES
Patient transferred to mother/baby room 356 via wheelchair in stable condition with baby and personal belongings. Accompanied by  and staff. IV access in situ. Report given to mother/baby RN Mike Barber.  ID bands verified between nurses and was corre

## 2020-10-25 NOTE — ANESTHESIA POSTPROCEDURE EVALUATION
Patient: Azucena Ferrell    Procedure Summary     Date: 10/24/20 Room / Location:     Anesthesia Start: 7173 Anesthesia Stop: 10/25/20 0320    Procedure: LABOR ANALGESIA Diagnosis:     Scheduled Providers:  Anesthesiologist: Miin Leonardo MD    Anesthesia

## 2020-10-25 NOTE — PLAN OF CARE
Problem: PAIN - ADULT  Goal: Verbalizes/displays adequate comfort level or patient's stated pain goal  Description: INTERVENTIONS:  - Encourage pt to monitor pain and request assistance  - Assess pain using appropriate pain scale  - Administer analgesics Progressing  Goal: Optimize infant feeding at the breast  Description: INTERVENTIONS:  - Initiate breast feeding within first hour after birth. - Monitor effectiveness of current breast feeding efforts.   - Assess support systems available to mother/famil engorgement. - Instruct on breast care. - Provide comfort measures. Outcome: Progressing  Goal: Appropriate maternal -  bonding  Description: INTERVENTIONS:  - Assess caregiver- interactions.   - Assess caregiver's emotional status and copi

## 2020-10-26 PROBLEM — Z34.93 PRENATAL CARE, THIRD TRIMESTER: Status: RESOLVED | Noted: 2020-10-14 | Resolved: 2020-10-26

## 2020-10-26 PROBLEM — O36.8190 DECREASED FETAL MOVEMENT: Status: RESOLVED | Noted: 2020-10-21 | Resolved: 2020-10-26

## 2020-10-26 PROBLEM — O48.0 POST-TERM PREGNANCY, 40-42 WEEKS OF GESTATION: Status: RESOLVED | Noted: 2020-10-24 | Resolved: 2020-10-26

## 2020-10-26 PROBLEM — O13.9 GESTATIONAL HYPERTENSION: Status: ACTIVE | Noted: 2020-10-26

## 2020-10-26 PROBLEM — Z34.90 PREGNANCY: Status: RESOLVED | Noted: 2020-10-24 | Resolved: 2020-10-26

## 2020-10-26 NOTE — LACTATION NOTE
This note was copied from a baby's chart.   LACTATION NOTE - INFANT    Evaluation Type  Evaluation Type: Inpatient    Problems & Assessment  Problems Diagnosed or Identified: Jaundice  Infant Assessment: Skin color: pink or appropriate for ethnicity  Muscle

## 2020-10-26 NOTE — PROGRESS NOTES
[place] [facility] - [facility]    OB/GYNE Progress Note      Artem Blackburn Patient Status:  Inpatient    3/15/1985 MRN [id number]   Location [facility] 3SE Attending Nickie Gee, [address] Day # 2 PCP No primary care provider on file.         Yared Component Value Date    TREPONEMALAB Negative 07/21/2020    ABO O 10/24/2020    RH Positive 10/24/2020    WBC 14.4 (H) 10/25/2020    HGB 8.7 (L) 10/25/2020    HCT 25.7 (L) 10/25/2020    .0 (L) 10/25/2020    CREATSERUM 0.54 (L) 08/28/2020    BUN 9

## 2020-10-26 NOTE — LACTATION NOTE
LACTATION NOTE - MOTHER      Evaluation Type: Inpatient    Problems identified  Problems identified: Knowledge deficit;Milk supply not WNL  Milk supply not WNL: Reduced (potential)    Maternal history  Maternal history: PIH    Breastfeeding goal  Breastfee

## 2020-10-27 VITALS
DIASTOLIC BLOOD PRESSURE: 75 MMHG | OXYGEN SATURATION: 99 % | HEART RATE: 69 BPM | TEMPERATURE: 98 F | SYSTOLIC BLOOD PRESSURE: 133 MMHG | RESPIRATION RATE: 16 BRPM

## 2020-10-27 RX ORDER — MELATONIN
325 2 TIMES DAILY WITH MEALS
Status: DISCONTINUED | OUTPATIENT
Start: 2020-10-27 | End: 2020-10-27

## 2020-10-27 NOTE — LACTATION NOTE
This note was copied from a baby's chart.   LACTATION NOTE - INFANT    Evaluation Type  Evaluation Type: Inpatient    Problems & Assessment  Problems Diagnosed or Identified: Jaundice  Muscle tone: Appropriate for GA    Feeding Assessment  Summary Current F

## 2020-10-27 NOTE — DISCHARGE SUMMARY
[facility]    Discharge Summary    Ezra Means Patient Status:  Inpatient    3/15/1985 MRN [id number]   Location [facility] 3SE Attending Sina Fenton, [address] Day # 3       Delivering OB Clinician:  Virginia Ornelas CNM    EDC:

## 2020-10-27 NOTE — PAYOR COMM NOTE
--------------  ADMISSION REVIEW     Payor: Sheldon Arechiga #:  [id number]  Authorization Number: [id number]    Admit date: 10/24/20  Admit time: 2631       Admitting Physician: Mendoza Perez MD  Attending Physi Past Surgical History:   Procedure Laterality Date   • CERCLAGE N/A 6/23/2020    Performed by Rich John DO at [address] [place] [address] L+D OR     Family History:   Family History   Problem Relation Age of Onset   • Osteoporosis Mother    • Asthma Son      Social Histor Effacement: [de-identified]  Station: -2  AROM of clear fluid  Extremities: extremities normal, atraumatic, no cyanosis or edema  Musculoskeletal: steady gait  Reflexes: +1/+1  Psych:  Appropriate affect and speech    Results:     Prenatal Results     Initial     Test GC DNA Negative  Negative 04/06/20 1125    Chlamydia DNA Negative  Negative 04/06/20 1125          24-28 Weeks     Test Value Reference Range Date Time    HCT 33.7 % 35.0 - 48.0 07/21/20 1003      32.5 % 35.0 - 48.0 06/23/20 1055    HGB 11.4 g/dL 12.0 - 1 Obstetrical history significant for  Incompetent cervix with cerclage.       Admission problem(s):      Post term pregnancy for IOL  Category 1 FHR tracing        Risks, benefits, alternatives and possible complications have been discussed in detail with th Delivery Complications  none     Neonatologist Present: no  Delivery Comment:  Pt pushed successfully to a  of a viable male infant. Good cry with stimulation. Delayed cord clamping by CNM and cut by FOB.   Placenta delivered Mihai Forester spontaneously appe Last data filed at 10/25/2020 0815      Gross per 24 hour   Intake —   Output 500 ml   Net -500 ml         Weight (Last 6):   Wt Readings from Last 6 Encounters:  10/21/20 : 183 lb (83 kg)  10/14/20 : 184 lb 4 oz (83.6 kg)  10/05/20 : 181 lb 6.4 oz (82.3 kg Allen Fuentes Patient Status:  Inpatient    3/15/1985 MRN [id number]   Location [facility] 3SE Attending Jennie Young, [address] Day # 3 PCP No primary care provider on file.      Assessment/Plan     Vaginal delivery       Gestational hyperten    10/26/2020     K 4.1 10/26/2020      10/26/2020     CO2 27.0 10/26/2020     GLU 79 10/26/2020     CA 9.2 10/26/2020     ALB 2.3 (L) 10/26/2020     ALKPHO 143 (H) 10/26/2020     BILT 0.3 10/26/2020     TP 6.3 (L) 10/26/2020     AST 18 10/26/2

## 2020-10-27 NOTE — PROGRESS NOTES
[facility]    OB/GYNE Progress Note      Marley Kent Patient Status:  Inpatient    3/15/1985 MRN [id number]   Location [facility] 3SE Attending Pam Franklin, [address] Day # 3 PCP No primary care provider on file.         Yared 10/26/2020    K 4.1 10/26/2020     10/26/2020    CO2 27.0 10/26/2020    GLU 79 10/26/2020    CA 9.2 10/26/2020    ALB 2.3 (L) 10/26/2020    ALKPHO 143 (H) 10/26/2020    BILT 0.3 10/26/2020    TP 6.3 (L) 10/26/2020    AST 18 10/26/2020    ALT 10 (L) 1

## 2020-10-27 NOTE — LACTATION NOTE
LACTATION NOTE - MOTHER      Evaluation Type: Inpatient    Problems identified  Problems identified: Knowledge deficit  Milk supply not WNL: Reduced (potential)  Problems Identified Other: baby is being supplemented with formula for jaundice and mom is not

## 2020-10-30 ENCOUNTER — POSTPARTUM (OUTPATIENT)
Dept: OBGYN CLINIC | Facility: CLINIC | Age: 35
End: 2020-10-30
Payer: MEDICAID

## 2020-10-30 ENCOUNTER — HOSPITAL ENCOUNTER (OUTPATIENT)
Facility: HOSPITAL | Age: 35
Setting detail: OBSERVATION
Discharge: HOME | DRG: 776 | End: 2020-11-01
Attending: EMERGENCY MEDICINE | Admitting: OBSTETRICS & GYNECOLOGY
Payer: MEDICAID

## 2020-10-30 ENCOUNTER — APPOINTMENT (OUTPATIENT)
Dept: ULTRASOUND IMAGING | Facility: HOSPITAL | Age: 35
DRG: 776 | End: 2020-10-30
Attending: EMERGENCY MEDICINE
Payer: MEDICAID

## 2020-10-30 VITALS — SYSTOLIC BLOOD PRESSURE: 144 MMHG | TEMPERATURE: 99 F | DIASTOLIC BLOOD PRESSURE: 89 MMHG | HEART RATE: 108 BPM

## 2020-10-30 DIAGNOSIS — R50.9 FEBRILE ILLNESS: Primary | ICD-10-CM

## 2020-10-30 DIAGNOSIS — R30.9 PAIN WITH URINATION: ICD-10-CM

## 2020-10-30 DIAGNOSIS — R03.0 ELEVATED BLOOD PRESSURE READING: ICD-10-CM

## 2020-10-30 PROBLEM — N71.9 ENDOMETRITIS: Status: ACTIVE | Noted: 2020-10-30

## 2020-10-30 PROCEDURE — 3079F DIAST BP 80-89 MM HG: CPT | Performed by: ADVANCED PRACTICE MIDWIFE

## 2020-10-30 PROCEDURE — 81002 URINALYSIS NONAUTO W/O SCOPE: CPT | Performed by: ADVANCED PRACTICE MIDWIFE

## 2020-10-30 PROCEDURE — 76856 US EXAM PELVIC COMPLETE: CPT | Performed by: EMERGENCY MEDICINE

## 2020-10-30 PROCEDURE — 3077F SYST BP >= 140 MM HG: CPT | Performed by: ADVANCED PRACTICE MIDWIFE

## 2020-10-30 PROCEDURE — 99214 OFFICE O/P EST MOD 30 MIN: CPT | Performed by: ADVANCED PRACTICE MIDWIFE

## 2020-10-30 RX ORDER — BISACODYL 10 MG
10 SUPPOSITORY, RECTAL RECTAL ONCE AS NEEDED
Status: DISCONTINUED | OUTPATIENT
Start: 2020-10-30 | End: 2020-11-01

## 2020-10-30 RX ORDER — DOCUSATE SODIUM 100 MG/1
100 CAPSULE, LIQUID FILLED ORAL 2 TIMES DAILY
Status: DISCONTINUED | OUTPATIENT
Start: 2020-10-30 | End: 2020-11-01

## 2020-10-30 RX ORDER — CLINDAMYCIN PHOSPHATE 600 MG/50ML
600 INJECTION INTRAVENOUS ONCE
Status: COMPLETED | OUTPATIENT
Start: 2020-10-30 | End: 2020-10-30

## 2020-10-30 RX ORDER — ONDANSETRON 2 MG/ML
4 INJECTION INTRAMUSCULAR; INTRAVENOUS EVERY 6 HOURS PRN
Status: DISCONTINUED | OUTPATIENT
Start: 2020-10-30 | End: 2020-11-01

## 2020-10-30 RX ORDER — IBUPROFEN 600 MG/1
600 TABLET ORAL EVERY 6 HOURS PRN
Status: DISCONTINUED | OUTPATIENT
Start: 2020-10-30 | End: 2020-11-01

## 2020-10-30 RX ORDER — ACETAMINOPHEN 325 MG/1
650 TABLET ORAL EVERY 6 HOURS PRN
Status: DISCONTINUED | OUTPATIENT
Start: 2020-10-30 | End: 2020-11-01

## 2020-10-30 NOTE — ED PROVIDER NOTES
Patient Seen in: [facility] AND [facility] Emergency Department      History   Patient presents with:  Bleeding    Stated Complaint: bleeding ?  left over placenta     HPI    52-year-old female postpartum day 5 status post , sent here by midwife for suspected HPI.  Constitutional and vital signs reviewed. All other systems reviewed and negative except as noted above.     Physical Exam     ED Triage Vitals [10/30/20 1515]   /88   Pulse 97   Resp 20   Temp 99.4 °F (37.4 °C)   Temp src Oral   SpO2 97 % Range    Hold Blue Auto Resulted    RAINBOW DRAW LAVENDER    Collection Time: 10/30/20  4:49 PM   Result Value Ref Range    Hold Lavender Auto Resulted    RAINBOW DRAW LIGHT GREEN    Collection Time: 10/30/20  4:49 PM   Result Value Ref Range    Hold Lt Gr Rapid SARS-CoV-2 by PCR Not Detected Not Detected       Imaging Results Available and Reviewed by me while in ED:  Us Pelvis (CIX=[id number])    Result Date: 10/30/2020  CONCLUSION:  1.  Anteverted uterus is enlarged and mildly heterogeneous in echotexture consi Clinical Impression:  Febrile illness  (primary encounter diagnosis)  Postpartum hemorrhage, unspecified type    Disposition:  Admit  10/30/2020  7:05 pm    Follow-up:  No follow-up provider specified.         Medications Prescribed:  Current Discharge

## 2020-10-30 NOTE — ED INITIAL ASSESSMENT (HPI)
Pt came in for vaginal bleeding with clots that started yesterday. + fever and chills. Pt delivered Baby Sunday. Pt denies SOB. Pt is A/O x 4, breathing is unlabored.

## 2020-10-30 NOTE — PROGRESS NOTES
Dave Martin is a 30year old female. HPI:   Patient presents with:  Postpartum Care: 5 days PP. pt c/o fever, increased vaginal bleeding, Headaches, pain in bilateral legs from knee down, back pain, and pain with urination.  Last took ibuprofen last n PRENATAL VITAMINS) 28-0.8 MG Oral Tab Take by mouth. • Misc.  Devices (BREAST PUMP) Does not apply Misc Use as directed (Patient not taking: Reported on 10/30/2020 ) 1 each 0       Allergies:  No Known Allergies      ROS:   Review of Systems   Constitut enlarged and tender. Uterus is not deviated and not fixed. Cervix exhibits motion tenderness and discharge (clots, bleeding from cervix). Right adnexum displays tenderness. Right adnexum displays no mass. Left adnexum displays tenderness.  Left adnexum disp

## 2020-10-31 PROCEDURE — 99223 1ST HOSP IP/OBS HIGH 75: CPT | Performed by: OBSTETRICS & GYNECOLOGY

## 2020-10-31 NOTE — PROGRESS NOTES
[facility] Dosing Service    Initial Pharmacokinetic Consult for Once Daily Aminoglycoside Dosing      Eric Acuña is a 30year old patient who is being treated for  ENDOMETRITIS . Pharmacy has been asked to dose gentamicin  by Dr. Chuy Lloyd.     Patient changes, toxicity and efficacy. Pharmacy will continue to follow. We appreciate the opportunity to assist in the care of this patient.     [facility]  10/31/2020  2:40 AM

## 2020-10-31 NOTE — PLAN OF CARE
Patient A&Ox4. Pain managed with Motrin PRN. Vitals stable. NPO @ midnight. IV abx continued. Patient still complaining of moderate bleeding, MD made aware. Patient ambulating independently. Call light in reach.   Problem: Patient Centered Care  Goal: Stephaniee

## 2020-10-31 NOTE — ED NOTES
Orders for admission, patient is aware of plan and ready to go upstairs. Any questions, please call ED LAWRENCE Lazcano extension [phone].       Type of COVID test sent: rapid  COVID Suspicion level: low, not detected    Titratable drug(s) infusing: NA  Rate: NA

## 2020-10-31 NOTE — PLAN OF CARE
Dorothea Hernandez has had no complaints throughout the day. Tolerating general diet. VSS. States her vaginal bleeding is improving. IV antibiotics given. Plan to be discharged home tomorrow.   Problem: Patient Centered Care  Goal: Patient preferences are identified a

## 2020-10-31 NOTE — H&P
[facility], [facility]  Obstetrics and Gynecology    MD Kalyan Osborne Ruchikrystle is a 30year old female presenting for postpartum endometritis.     HPI:   Patient presents with:  Bleeding    Patient is a 52-year-old  who is status post normal sponta Term 11/27/07 40w0d  7 lb (3.175 kg) F NORMAL SPONT EPI N BERE      Complications: Hemorrhage   1 Term 10/10/06 40w0d  7 lb 4 oz (3.289 kg) M NORMAL SPONT EPI N BERE         Past Medical History:   Diagnosis Date   • Anemia     during all pregnancies   • Ges sexual activity: Not on file    Other Topics      Concerns:         Service: No        Blood Transfusions: Not Asked        Caffeine Concern: Yes          occas        Occupational Exposure: Not Asked        Hobby Hazards: Not Asked        Sleep Co 0.55 - 1.02 mg/dL    BUN/CREA Ratio 10.0 10.0 - 20.0    Calcium, Total 9.9 8.5 - 10.1 mg/dL    Calculated Osmolality 288 275 - 295 mOsm/kg    GFR, Non- 113 >=60    GFR, -American 131 >=60   CBC W/ DIFFERENTIAL    Collection Time: 10/ x10(3) uL    Basophil Absolute 0.06 0.00 - 0.20 x10(3) uL    Immature Granulocyte Absolute 0.18 0.00 - 1.00 x10(3) uL    Neutrophil % 70.6 %    Lymphocyte % 18.0 %    Monocyte % 7.1 %    Eosinophil % 2.4 %    Basophil % 0.5 %    Immature Granulocyte % 1.4 SpO2 99%   BMI 30.11 kg/m²   Patient Vitals for the past 24 hrs:   BP Temp Temp src Pulse Resp SpO2 Height Weight   10/31/20 0448 136/83 98.3 °F (36.8 °C) Oral 58 18 99 % — —   10/30/20 1950 (!) 139/95 99.8 °F (37.7 °C) Oral 85 18 100 % — —   10/30/20 1830 bleeding and has had an increase in her hemoglobin since delivery. She did have a drop of hemoglobin from yesterday to today but so that may be explained by IV hydration.   I discussed with patient the option of a D&C today to remove possible retained prod

## 2020-11-01 ENCOUNTER — TELEPHONE (OUTPATIENT)
Dept: OBGYN CLINIC | Facility: CLINIC | Age: 35
End: 2020-11-01

## 2020-11-01 VITALS
TEMPERATURE: 98 F | HEIGHT: 63 IN | OXYGEN SATURATION: 100 % | SYSTOLIC BLOOD PRESSURE: 123 MMHG | BODY MASS INDEX: 30.12 KG/M2 | DIASTOLIC BLOOD PRESSURE: 87 MMHG | WEIGHT: 170 LBS | RESPIRATION RATE: 18 BRPM | HEART RATE: 98 BPM

## 2020-11-01 PROCEDURE — 99238 HOSP IP/OBS DSCHRG MGMT 30/<: CPT | Performed by: OBSTETRICS & GYNECOLOGY

## 2020-11-01 RX ORDER — AMOXICILLIN 500 MG/1
500 CAPSULE ORAL 3 TIMES DAILY
Qty: 21 CAPSULE | Refills: 0 | Status: SHIPPED | COMMUNITY
Start: 2020-11-01 | End: 2020-11-01

## 2020-11-01 RX ORDER — AMOXICILLIN AND CLAVULANATE POTASSIUM 500; 125 MG/1; MG/1
1 TABLET, FILM COATED ORAL
Qty: 14 TABLET | Refills: 0 | Status: SHIPPED | OUTPATIENT
Start: 2020-11-01 | End: 2020-11-01

## 2020-11-01 RX ORDER — AMOXICILLIN AND CLAVULANATE POTASSIUM 500; 125 MG/1; MG/1
1 TABLET, FILM COATED ORAL
Qty: 14 TABLET | Refills: 0 | Status: SHIPPED | COMMUNITY
Start: 2020-11-01 | End: 2020-12-09

## 2020-11-01 RX ORDER — AMOXICILLIN 500 MG/1
500 CAPSULE ORAL 3 TIMES DAILY
Qty: 21 CAPSULE | Refills: 0 | Status: SHIPPED | OUTPATIENT
Start: 2020-11-01 | End: 2020-11-01

## 2020-11-01 NOTE — DISCHARGE SUMMARY
31 yo female admitted by dr Kiet Dinh for endometritis/r/o poss retained poc. Clinically pt seen on admission and pt declined d & c and wanted iv abx for 24-48 afebrile.   Pt noted today that she feels well and wants to be discharged home and to f/u with dr Vann Needs

## 2020-11-01 NOTE — PLAN OF CARE
Patient asleep intermittently through night. Rates pain as 1-2 when awake. Vaginal bleeding much lighter per patient. IV antibiotics continued as ordered.     Problem: Patient Centered Care  Goal: Patient preferences are identified and integrated in the

## 2020-11-01 NOTE — PLAN OF CARE
Problem: Patient Centered Care  Goal: Patient preferences are identified and integrated in the patient's plan of care  Description: Interventions:  - What would you like us to know as we care for you?  I had my baby 5 days ago  - Provide timely, complete,

## 2020-11-02 NOTE — PAYOR COMM NOTE
--------------  ADMISSION REVIEW     Payor: Sheldon Arechiga #:  [id number]  Authorization Number: [id number]    Admit date: 10/30/20  Admit time: 1950       Admitting Physician: Briana Cortez MD  Attending Physici Currently          Drug use: Never             Review of Systems   Constitutional: Positive for fever. HENT: Negative for congestion. Eyes: Negative for visual disturbance. Respiratory: Negative for shortness of breath.     Cardiovascular: Negative f oxygenation.     PROCEDURES:  none    DIAGNOSTICS:   Labs:  Recent Results (from the past 24 hour(s))   URINALYSIS NONAUTO W/O SCOPE    Collection Time: 10/30/20  1:41 PM   Result Value Ref Range    Glucose Urine neg mg/dL    Bilirubin neg Negative    [place] g/dL    RDW-SD 45.9 35.1 - 46.3 fL    RDW 15.5 (H) 11.0 - 15.0 %    .0 150.0 - 450.0 10(3)uL    Neutrophil Absolute Prelim 13.01 (H) 1.50 - 7.70 x10 (3) uL    Neutrophil Absolute 13.01 (H) 1.50 - 7.70 x10(3) uL    Lymphocyte Absolute 1.90 1.00 - 4. 0 endometritis  - clinda/gent ordered  - discussed with Dr. David Madsen - informed him of pt admission      Medical Record Review: I personally reviewed available prior medical records for any recent pertinent discharge summaries, testing, and procedures, and rev Thursday night and Friday with passing some clots. On clinical exam she had uterine tenderness and her cervix was slightly dilated.   She also complained that she had pain in her lower abdomen and legs diffusely she was sent to the ER for probable endometr CERCGUTIERREZ N/A 6/23/2020    Performed by Katerin Henry DO at [address] [place] [address] L+D OR       Family History   Problem Relation Age of Onset   • Osteoporosis Mother    • Asthma Son        Social History    Socioeconomic History      Marital status:       Spouse Seat Belt: Yes        Self-Exams: Not Asked    Social History Narrative      Not on file    Lab results:     Recent Results (from the past 24 hour(s))   URINALYSIS NONAUTO W/O SCOPE    Collection Time: 10/30/20  1:41 PM   Result Value Ref Range    Glucose MCH 28.4 26.0 - 34.0 pg    MCHC 33.6 31.0 - 37.0 g/dL    RDW-SD 45.9 35.1 - 46.3 fL    RDW 15.5 (H) 11.0 - 15.0 %    .0 150.0 - 450.0 10(3)uL    Neutrophil Absolute Prelim 13.01 (H) 1.50 - 7.70 x10 (3) uL    Neutrophil Absolute 13.01 (H) 1.50 - 7.70 is 17.9 x 7.9 x 10.2 cm. ENDOMETRIUM:           The endometrium measures 16 mm in the uterine body and 20 mm in the lower uterus/cervix with internal hyperechoic fluid. There is no internal vascularity.   No fluid or mass in the endometrial canal. 97 % 5' 3\" (1.6 m) 170 lb (77.1 kg)       GENERAL: well developed, well nourished, in no apparent distress  CVS:  RRR  LUNGS:  CTA bilaterally  ABDOMEN: Soft, non distended; non tender; post partum uterus below umbilicus and non-tender today (pt states sh may return with bleeding and required D&C at a later time. Patient understands all the above and has declined a D&C at this time. She would like to follow conservatively. We will continue IV antibiotics and observation at this time.   This is a 45 minute

## 2020-11-04 NOTE — PAYOR COMM NOTE
--------------  DISCHARGE REVIEW    Payor: Sheldon Arechiga #:  [id number]  Authorization Number: [id number]    Admit date: 10/30/20  Admit time:  1950  Discharge Date: 11/1/2020  3:34 PM     Admitting Physician: Jacquie Sims

## 2020-11-09 ENCOUNTER — TELEPHONE (OUTPATIENT)
Dept: OBGYN CLINIC | Facility: CLINIC | Age: 35
End: 2020-11-09

## 2020-11-09 ENCOUNTER — TELEPHONE (OUTPATIENT)
Dept: OBGYN UNIT | Facility: HOSPITAL | Age: 35
End: 2020-11-09

## 2020-11-09 ENCOUNTER — OFFICE VISIT (OUTPATIENT)
Dept: OBGYN CLINIC | Facility: CLINIC | Age: 35
End: 2020-11-09
Payer: MEDICAID

## 2020-11-09 VITALS — DIASTOLIC BLOOD PRESSURE: 70 MMHG | WEIGHT: 160 LBS | BODY MASS INDEX: 28 KG/M2 | SYSTOLIC BLOOD PRESSURE: 122 MMHG

## 2020-11-09 DIAGNOSIS — Z30.2 REQUEST FOR STERILIZATION: Primary | ICD-10-CM

## 2020-11-09 DIAGNOSIS — Z30.09 STERILIZATION CONSULT: ICD-10-CM

## 2020-11-09 DIAGNOSIS — O22.40: ICD-10-CM

## 2020-11-09 PROCEDURE — 99213 OFFICE O/P EST LOW 20 MIN: CPT | Performed by: OBSTETRICS & GYNECOLOGY

## 2020-11-09 PROCEDURE — 1111F DSCHRG MED/CURRENT MED MERGE: CPT | Performed by: OBSTETRICS & GYNECOLOGY

## 2020-11-09 PROCEDURE — 3078F DIAST BP <80 MM HG: CPT | Performed by: OBSTETRICS & GYNECOLOGY

## 2020-11-09 PROCEDURE — 3074F SYST BP LT 130 MM HG: CPT | Performed by: OBSTETRICS & GYNECOLOGY

## 2020-11-09 NOTE — PROGRESS NOTES
[facility], [facility]  Obstetrics and Gynecology  Focused Gynecology Problem Exam  Per Irby MD    Andrew Dey is a 30year old female presenting for Gyn Exam (Folow up ER visit)  .     HPI:   Patient presents with:  Gyn Exam: Folow up ER visit    Patien Diagnosis Date   • Anemia     during all pregnancies   • Gestational hypertension 10/26/2020   •  labor    • [facility] history      [facility]       Past Surgical History:   Procedure Laterality Date   • CERCLAGE N/A  Exposure: Not Asked        Hobby Hazards: Not Asked        Sleep Concern: Not Asked        Stress Concern: No        Weight Concern: Not Asked        Special Diet: No        Back Care: Not Asked        Exercise: Yes          strength         Bike Helmet: N Hydrocortisone Acetate 25 MG Rectal Suppos 28 suppository 0     Sig: Place 1 suppository (25 mg total) rectally 2 (two) times daily.      IMAGING/ REFERRALS:    None     Manda Carlton MD  11/9/2020  3:20 PM

## 2020-11-09 NOTE — TELEPHONE ENCOUNTER
Regarding: Labs  ----- Message from Ashanti Disla MD sent at 11/9/2020  3:30 PM CST -----       ----- Message sent from Lloyd You to Loletha Duane at 3/11/2020  6:11 AM -----   Letta Fuel-  Your prenatal labs were normal.  We can review them in more detail at your next visit.    Lm Skinner CNM

## 2020-12-09 ENCOUNTER — POSTPARTUM (OUTPATIENT)
Dept: OBGYN CLINIC | Facility: CLINIC | Age: 35
End: 2020-12-09
Payer: MEDICAID

## 2020-12-09 VITALS
DIASTOLIC BLOOD PRESSURE: 77 MMHG | WEIGHT: 159.19 LBS | HEART RATE: 74 BPM | BODY MASS INDEX: 28 KG/M2 | SYSTOLIC BLOOD PRESSURE: 119 MMHG

## 2020-12-09 DIAGNOSIS — Z30.2 REQUEST FOR STERILIZATION: ICD-10-CM

## 2020-12-09 PROBLEM — O16.3 ELEVATED BLOOD PRESSURE AFFECTING PREGNANCY IN THIRD TRIMESTER, ANTEPARTUM: Status: RESOLVED | Noted: 2020-08-28 | Resolved: 2020-12-09

## 2020-12-09 PROBLEM — N71.9 ENDOMETRITIS: Status: RESOLVED | Noted: 2020-10-30 | Resolved: 2020-12-09

## 2020-12-09 PROBLEM — O13.9 GESTATIONAL HYPERTENSION: Status: RESOLVED | Noted: 2020-10-26 | Resolved: 2020-12-09

## 2020-12-09 PROBLEM — O34.30 INCOMPETENT CERVIX IN PREGNANCY, ANTEPARTUM: Status: RESOLVED | Noted: 2020-06-23 | Resolved: 2020-12-09

## 2020-12-09 PROCEDURE — 0503F POSTPARTUM CARE VISIT: CPT | Performed by: ADVANCED PRACTICE MIDWIFE

## 2020-12-09 PROCEDURE — 3074F SYST BP LT 130 MM HG: CPT | Performed by: ADVANCED PRACTICE MIDWIFE

## 2020-12-09 PROCEDURE — 3078F DIAST BP <80 MM HG: CPT | Performed by: ADVANCED PRACTICE MIDWIFE

## 2020-12-09 NOTE — PROGRESS NOTES
Patient here for postpartum check-up. Vaginal delivery @ 6 weeks ago with CNM . Breastfeeding exclusively, on demand. Baby with adequate weight gain. Denies fevers, chills, body aches and flu-like symptoms.   Denies abdominal pain, no pelvic pain, reports

## 2020-12-11 ENCOUNTER — TELEPHONE (OUTPATIENT)
Dept: OBGYN CLINIC | Facility: CLINIC | Age: 35
End: 2020-12-11

## 2020-12-11 DIAGNOSIS — Z30.2 REQUEST FOR STERILIZATION: Primary | ICD-10-CM

## 2020-12-11 NOTE — TELEPHONE ENCOUNTER
The only day available in Dec would be, 12/23 at 11:45 am.  Otherwise 12/30/20 at 11:45 or 1 pm, but we have you on vacation Dec 25- through Dec 30. pls advise if 12/23 works.

## 2020-12-11 NOTE — TELEPHONE ENCOUNTER
12/23/2020 at 11:45 AM we will work for my schedule. If she chooses that time I will need to block my schedule in the office.

## 2020-12-11 NOTE — TELEPHONE ENCOUNTER
Pt scheduled 12/23 at 11:45am.  Pre-op: 12/15 at [facility]  Surgery guide sent via my chart. Please print consent form for provider to sign the second portion of that consent form and be sent out to scan.      [facility] ARNALDO office hours 8-10  Virtual office 1-3pm.  Janny Drake

## 2020-12-11 NOTE — TELEPHONE ENCOUNTER
Please schedule the following surgery:    Procedure: Laparoscopic bilateral salpingectomy with possible laparotomy  Assist: (Y/N or none) no  Date: As soon as possible  Dx: Consult for female sterilization  Pre-op appt: (Y/N or n/a) yes  Admission type: (I

## 2020-12-15 ENCOUNTER — OFFICE VISIT (OUTPATIENT)
Dept: OBGYN CLINIC | Facility: CLINIC | Age: 35
End: 2020-12-15
Payer: MEDICAID

## 2020-12-15 VITALS
WEIGHT: 158.69 LBS | SYSTOLIC BLOOD PRESSURE: 116 MMHG | DIASTOLIC BLOOD PRESSURE: 76 MMHG | HEART RATE: 89 BPM | BODY MASS INDEX: 28 KG/M2

## 2020-12-15 DIAGNOSIS — Z30.09 STERILIZATION CONSULT: Primary | ICD-10-CM

## 2020-12-15 PROCEDURE — 3078F DIAST BP <80 MM HG: CPT | Performed by: OBSTETRICS & GYNECOLOGY

## 2020-12-15 PROCEDURE — 99214 OFFICE O/P EST MOD 30 MIN: CPT | Performed by: OBSTETRICS & GYNECOLOGY

## 2020-12-15 PROCEDURE — 3074F SYST BP LT 130 MM HG: CPT | Performed by: OBSTETRICS & GYNECOLOGY

## 2020-12-15 NOTE — PROGRESS NOTES
[facility], [facility]  Obstetrics and Gynecology  Focused Gynecology Problem Exam  Akiko Silvestre MD    Hue Adkins is a 30year old female presenting for Pre-Op (sx 12/23)  .     HPI:   Patient presents with:  Pre-Op: sx 12/23    Patient is here today to dis     Social Needs      Financial resource strain: Not hard at all      Food insecurity        Worry: Never true        Inability: Never true      Transportation needs        Medical: No        Non-medical: No    Tobacco Use      Smoking st well nourished, in no apparent distress  CVS: Regular rate and rhythm  Lungs: Clear to auscultation bilaterally  ABDOMEN: Soft, non distended; non tender, no masses  GYNE/: External Genitalia: Normal appearing, no lesions.  Urethral meatus appear wnl, no

## 2020-12-17 ENCOUNTER — MED REC SCAN ONLY (OUTPATIENT)
Dept: OBGYN CLINIC | Facility: CLINIC | Age: 35
End: 2020-12-17

## 2020-12-21 ENCOUNTER — TELEPHONE (OUTPATIENT)
Dept: OBGYN CLINIC | Facility: CLINIC | Age: 35
End: 2020-12-21

## 2020-12-21 ENCOUNTER — LAB ENCOUNTER (OUTPATIENT)
Dept: LAB | Age: 35
End: 2020-12-21
Attending: OBSTETRICS & GYNECOLOGY
Payer: MEDICAID

## 2020-12-21 DIAGNOSIS — Z01.812 PRE-PROCEDURE LAB EXAM: Primary | ICD-10-CM

## 2020-12-21 DIAGNOSIS — Z01.818 PREOP TESTING: Primary | ICD-10-CM

## 2020-12-21 NOTE — TELEPHONE ENCOUNTER
Patient name and  verified. Patient informed order has been placed and was given number to central scheduling. Advised to have lab done ASAP or sx may be cancelled. Patient verbalized understanding.

## 2020-12-23 ENCOUNTER — ANESTHESIA EVENT (OUTPATIENT)
Dept: SURGERY | Facility: HOSPITAL | Age: 35
End: 2020-12-23
Payer: MEDICAID

## 2020-12-23 ENCOUNTER — HOSPITAL ENCOUNTER (OUTPATIENT)
Facility: HOSPITAL | Age: 35
Setting detail: HOSPITAL OUTPATIENT SURGERY
Discharge: HOME | End: 2020-12-23
Attending: OBSTETRICS & GYNECOLOGY | Admitting: OBSTETRICS & GYNECOLOGY
Payer: MEDICAID

## 2020-12-23 ENCOUNTER — ANESTHESIA (OUTPATIENT)
Dept: SURGERY | Facility: HOSPITAL | Age: 35
End: 2020-12-23
Payer: MEDICAID

## 2020-12-23 VITALS
SYSTOLIC BLOOD PRESSURE: 133 MMHG | OXYGEN SATURATION: 97 % | HEART RATE: 51 BPM | DIASTOLIC BLOOD PRESSURE: 72 MMHG | TEMPERATURE: 98 F | BODY MASS INDEX: 28.14 KG/M2 | WEIGHT: 158.81 LBS | RESPIRATION RATE: 17 BRPM | HEIGHT: 63 IN

## 2020-12-23 DIAGNOSIS — Z30.2 REQUEST FOR STERILIZATION: ICD-10-CM

## 2020-12-23 PROCEDURE — 58661 LAPAROSCOPY REMOVE ADNEXA: CPT | Performed by: OBSTETRICS & GYNECOLOGY

## 2020-12-23 PROCEDURE — 0UT74ZZ RESECTION OF BILATERAL FALLOPIAN TUBES, PERCUTANEOUS ENDOSCOPIC APPROACH: ICD-10-PCS | Performed by: OBSTETRICS & GYNECOLOGY

## 2020-12-23 RX ORDER — ONDANSETRON 2 MG/ML
4 INJECTION INTRAMUSCULAR; INTRAVENOUS ONCE AS NEEDED
Status: DISCONTINUED | OUTPATIENT
Start: 2020-12-23 | End: 2020-12-23

## 2020-12-23 RX ORDER — HYDROMORPHONE HYDROCHLORIDE 1 MG/ML
0.4 INJECTION, SOLUTION INTRAMUSCULAR; INTRAVENOUS; SUBCUTANEOUS EVERY 5 MIN PRN
Status: DISCONTINUED | OUTPATIENT
Start: 2020-12-23 | End: 2020-12-23

## 2020-12-23 RX ORDER — PROCHLORPERAZINE EDISYLATE 5 MG/ML
5 INJECTION INTRAMUSCULAR; INTRAVENOUS ONCE AS NEEDED
Status: DISCONTINUED | OUTPATIENT
Start: 2020-12-23 | End: 2020-12-23

## 2020-12-23 RX ORDER — HYDROMORPHONE HYDROCHLORIDE 1 MG/ML
INJECTION, SOLUTION INTRAMUSCULAR; INTRAVENOUS; SUBCUTANEOUS AS NEEDED
Status: DISCONTINUED | OUTPATIENT
Start: 2020-12-23 | End: 2020-12-23 | Stop reason: SURG

## 2020-12-23 RX ORDER — HALOPERIDOL 5 MG/ML
0.25 INJECTION INTRAMUSCULAR ONCE AS NEEDED
Status: DISCONTINUED | OUTPATIENT
Start: 2020-12-23 | End: 2020-12-23

## 2020-12-23 RX ORDER — IBUPROFEN 600 MG/1
600 TABLET ORAL EVERY 6 HOURS PRN
Qty: 30 TABLET | Refills: 0 | Status: SHIPPED | OUTPATIENT
Start: 2020-12-23

## 2020-12-23 RX ORDER — DEXAMETHASONE SODIUM PHOSPHATE 4 MG/ML
VIAL (ML) INJECTION AS NEEDED
Status: DISCONTINUED | OUTPATIENT
Start: 2020-12-23 | End: 2020-12-23 | Stop reason: SURG

## 2020-12-23 RX ORDER — SODIUM CHLORIDE, SODIUM LACTATE, POTASSIUM CHLORIDE, CALCIUM CHLORIDE 600; 310; 30; 20 MG/100ML; MG/100ML; MG/100ML; MG/100ML
INJECTION, SOLUTION INTRAVENOUS CONTINUOUS
Status: DISCONTINUED | OUTPATIENT
Start: 2020-12-23 | End: 2020-12-23

## 2020-12-23 RX ORDER — ONDANSETRON 2 MG/ML
INJECTION INTRAMUSCULAR; INTRAVENOUS AS NEEDED
Status: DISCONTINUED | OUTPATIENT
Start: 2020-12-23 | End: 2020-12-23 | Stop reason: SURG

## 2020-12-23 RX ORDER — BUPIVACAINE HYDROCHLORIDE 2.5 MG/ML
INJECTION, SOLUTION EPIDURAL; INFILTRATION; INTRACAUDAL AS NEEDED
Status: DISCONTINUED | OUTPATIENT
Start: 2020-12-23 | End: 2020-12-23 | Stop reason: HOSPADM

## 2020-12-23 RX ORDER — HYDROCODONE BITARTRATE AND ACETAMINOPHEN 5; 325 MG/1; MG/1
2 TABLET ORAL AS NEEDED
Status: DISCONTINUED | OUTPATIENT
Start: 2020-12-23 | End: 2020-12-23

## 2020-12-23 RX ORDER — HYDROMORPHONE HYDROCHLORIDE 1 MG/ML
0.6 INJECTION, SOLUTION INTRAMUSCULAR; INTRAVENOUS; SUBCUTANEOUS EVERY 5 MIN PRN
Status: DISCONTINUED | OUTPATIENT
Start: 2020-12-23 | End: 2020-12-23

## 2020-12-23 RX ORDER — ACETAMINOPHEN 500 MG
1000 TABLET ORAL ONCE
Status: COMPLETED | OUTPATIENT
Start: 2020-12-23 | End: 2020-12-23

## 2020-12-23 RX ORDER — MORPHINE SULFATE 4 MG/ML
4 INJECTION, SOLUTION INTRAMUSCULAR; INTRAVENOUS EVERY 10 MIN PRN
Status: DISCONTINUED | OUTPATIENT
Start: 2020-12-23 | End: 2020-12-23

## 2020-12-23 RX ORDER — HYDROMORPHONE HYDROCHLORIDE 1 MG/ML
0.2 INJECTION, SOLUTION INTRAMUSCULAR; INTRAVENOUS; SUBCUTANEOUS EVERY 5 MIN PRN
Status: DISCONTINUED | OUTPATIENT
Start: 2020-12-23 | End: 2020-12-23

## 2020-12-23 RX ORDER — HYDROCODONE BITARTRATE AND ACETAMINOPHEN 5; 325 MG/1; MG/1
1 TABLET ORAL AS NEEDED
Status: DISCONTINUED | OUTPATIENT
Start: 2020-12-23 | End: 2020-12-23

## 2020-12-23 RX ORDER — ROCURONIUM BROMIDE 10 MG/ML
INJECTION, SOLUTION INTRAVENOUS AS NEEDED
Status: DISCONTINUED | OUTPATIENT
Start: 2020-12-23 | End: 2020-12-23 | Stop reason: SURG

## 2020-12-23 RX ORDER — SCOLOPAMINE TRANSDERMAL SYSTEM 1 MG/1
1 PATCH, EXTENDED RELEASE TRANSDERMAL ONCE
Status: DISCONTINUED | OUTPATIENT
Start: 2020-12-23 | End: 2020-12-23 | Stop reason: HOSPADM

## 2020-12-23 RX ORDER — LIDOCAINE HYDROCHLORIDE 10 MG/ML
INJECTION, SOLUTION EPIDURAL; INFILTRATION; INTRACAUDAL; PERINEURAL AS NEEDED
Status: DISCONTINUED | OUTPATIENT
Start: 2020-12-23 | End: 2020-12-23 | Stop reason: SURG

## 2020-12-23 RX ORDER — MORPHINE SULFATE 10 MG/ML
6 INJECTION, SOLUTION INTRAMUSCULAR; INTRAVENOUS EVERY 10 MIN PRN
Status: DISCONTINUED | OUTPATIENT
Start: 2020-12-23 | End: 2020-12-23

## 2020-12-23 RX ORDER — MORPHINE SULFATE 4 MG/ML
2 INJECTION, SOLUTION INTRAMUSCULAR; INTRAVENOUS EVERY 10 MIN PRN
Status: DISCONTINUED | OUTPATIENT
Start: 2020-12-23 | End: 2020-12-23

## 2020-12-23 RX ORDER — HYDROCODONE BITARTRATE AND ACETAMINOPHEN 5; 325 MG/1; MG/1
1 TABLET ORAL EVERY 6 HOURS PRN
Qty: 10 TABLET | Refills: 0 | Status: SHIPPED | OUTPATIENT
Start: 2020-12-23

## 2020-12-23 RX ORDER — NALOXONE HYDROCHLORIDE 0.4 MG/ML
80 INJECTION, SOLUTION INTRAMUSCULAR; INTRAVENOUS; SUBCUTANEOUS AS NEEDED
Status: DISCONTINUED | OUTPATIENT
Start: 2020-12-23 | End: 2020-12-23

## 2020-12-23 RX ADMIN — LIDOCAINE HYDROCHLORIDE 50 MG: 10 INJECTION, SOLUTION EPIDURAL; INFILTRATION; INTRACAUDAL; PERINEURAL at 12:09:00

## 2020-12-23 RX ADMIN — HYDROMORPHONE HYDROCHLORIDE 0.2 MG: 1 INJECTION, SOLUTION INTRAMUSCULAR; INTRAVENOUS; SUBCUTANEOUS at 12:45:00

## 2020-12-23 RX ADMIN — ONDANSETRON 4 MG: 2 INJECTION INTRAMUSCULAR; INTRAVENOUS at 13:00:00

## 2020-12-23 RX ADMIN — LIDOCAINE HYDROCHLORIDE 50 MG: 10 INJECTION, SOLUTION EPIDURAL; INFILTRATION; INTRACAUDAL; PERINEURAL at 12:20:00

## 2020-12-23 RX ADMIN — ROCURONIUM BROMIDE 50 MG: 10 INJECTION, SOLUTION INTRAVENOUS at 12:13:00

## 2020-12-23 RX ADMIN — DEXAMETHASONE SODIUM PHOSPHATE 8 MG: 4 MG/ML VIAL (ML) INJECTION at 12:20:00

## 2020-12-23 NOTE — INTERVAL H&P NOTE
Pre-op Diagnosis: Request for sterilization [Z30.2]    The above referenced H&P was reviewed by Migue West MD, MD on 12/23/2020, the patient was examined and no significant changes have occurred in the patient's condition since the H&P was performed

## 2020-12-23 NOTE — ANESTHESIA POSTPROCEDURE EVALUATION
Patient: Pee West    Procedure Summary     Date: 12/23/20 Room / Location: [facility] OR  / [facility] OR    Anesthesia Start: 1556 Anesthesia Stop: 7918    Procedure: LAPAROSCOPIC SALPINGECTOMY (Bilateral Abdomen) Diagnosis:       Request for steriliz

## 2020-12-23 NOTE — ANESTHESIA POSTPROCEDURE EVALUATION
Patient: Kalyan Brand    Procedure Summary     Date: 12/23/20 Room / Location: [facility] OR  / [facility] OR    Anesthesia Start: 0202 Anesthesia Stop: 0805    Procedure: LAPAROSCOPIC SALPINGECTOMY (Bilateral Abdomen) Diagnosis:       Request for steriliz

## 2020-12-23 NOTE — H&P
[facility]  Obstetrics and Gynecology  Focused Gynecology Problem Exam  Hector Merritt MD     Demetrius Navarro is a 30year old female presenting for Pre-Op (sx 12/23)  .     HPI:   Patient presents with:  Pre-Op: sx 12/23     Patient is here today to equivalent    Occupational History      Occupation:     Social Needs      Financial resource strain: Not hard at all      Food insecurity        Worry: Never true        Inability: Never true      Transportation needs        Medical: No 01/17/2020   BMI 28.11 kg/m²      GENERAL: well developed, well nourished, in no apparent distress  CVS: Regular rate and rhythm  Lungs: Clear to auscultation bilaterally  ABDOMEN: Soft, non distended; non tender, no masses  GYNE/: External Genitalia: No    Abril Walton MD  12/15/2020  5:20 PM

## 2020-12-23 NOTE — ANESTHESIA PROCEDURE NOTES
Airway  Urgency: Elective    Airway not difficult    General Information and Staff    Patient location during procedure: OR  Resident/CRNA: Carlee Griffin CRNA  Performed: CRNA     Indications and Patient Condition  Indications for airway management: anes

## 2020-12-23 NOTE — BRIEF OP NOTE
[facility] UNIT  Operative Note     [facility] Location: [facility] [id number] MRN [id number]   Admission Date 12/23/2020 Operation Date 12/23/2020   Attending Physician Amy Sandoval MD Operating Physician Donato Doshi

## 2020-12-23 NOTE — ANESTHESIA PREPROCEDURE EVALUATION
Anesthesia PreOp Note    HPI:     Artem Blackburn is a 30year old female who presents for preoperative consultation requested by: Truong Medina MD    Date of Surgery: 12/23/2020    Procedure(s):  LAPAROSCOPIC SALPINGECTOMY  Indication: Request for malou Smoking status: Never Smoker      Smokeless tobacco: Never Used    Substance and Sexual Activity      Alcohol use: Not Currently        Frequency: Monthly or less      Drug use: Never      Sexual activity: Not on file    Lifestyle      Physical activity Vital Signs: Body mass index is 28.13 kg/m². height is 1.6 m (5' 3\") and weight is 72 kg (158 lb 12.8 oz). Her oral temperature is 98.2 °F (36.8 °C). Her blood pressure is 116/78 and her pulse is 64.  Her respiration is 16 and oxygen saturation is 9

## 2020-12-24 NOTE — OPERATIVE REPORT
[facility]    PATIENT'S NAME: Thuan Millan   ATTENDING PHYSICIAN: Jewel Scott MD   OPERATING PHYSICIAN: Jewel Scott MD   PATIENT ACCOUNT#:   [id number]    LOCATION:  [place] [address]  MEDICAL RECORD #:   [id number]       DATE findings were visualized. The right fallopian tube was identified and followed to its fimbriated end. The tube was coagulated from the fimbria to the cornua and the anti-mesenteric border.   The tube was then transected by coagulation and excision with th

## 2020-12-30 ENCOUNTER — TELEPHONE (OUTPATIENT)
Dept: OBGYN CLINIC | Facility: CLINIC | Age: 35
End: 2020-12-30

## 2020-12-30 NOTE — TELEPHONE ENCOUNTER
Patient is requesting a post op follow up appointment, but states Dr. Susy Garza told her it has to be a video visit.

## 2021-01-14 ENCOUNTER — TELEMEDICINE (OUTPATIENT)
Dept: OBGYN CLINIC | Facility: CLINIC | Age: 36
End: 2021-01-14

## 2021-01-14 DIAGNOSIS — N89.8 VAGINAL ODOR: ICD-10-CM

## 2021-01-14 DIAGNOSIS — N89.8 VAGINAL DISCHARGE: ICD-10-CM

## 2021-01-14 DIAGNOSIS — Z98.890 POST-OPERATIVE STATE: Primary | ICD-10-CM

## 2021-01-14 PROBLEM — R50.9 FEBRILE ILLNESS: Status: RESOLVED | Noted: 2020-10-30 | Resolved: 2021-01-14

## 2021-01-14 PROCEDURE — 99024 POSTOP FOLLOW-UP VISIT: CPT | Performed by: OBSTETRICS & GYNECOLOGY

## 2021-01-14 RX ORDER — METRONIDAZOLE 500 MG/1
500 TABLET ORAL 2 TIMES DAILY
Qty: 14 TABLET | Refills: 1 | Status: SHIPPED | OUTPATIENT
Start: 2021-01-14 | End: 2021-01-21

## 2021-01-14 NOTE — PROGRESS NOTES
[facility]  Obstetrics and Gynecology  [facility] 26 Video Visit  Focused Gynecology Problem Exam  MD Barbara Valdivia Harjit is a 30year old female presenting for post operative visit after laparoscopic bilateral salpingectomy.     HPI:   Pt is s  post delivery - no reaction   •  labor        Past Surgical History:   Procedure Laterality Date   • CERCLAGE N/A 2020    Performed by Onelia Nicholson DO at [address] [place] [address] L+D OR   • Margarito Otto [address]  10/2020   • LAPAROSCOPIC SALPINGECTO Occupational Exposure: Not Asked        Hobby Hazards: Not Asked        Sleep Concern: Not Asked        Stress Concern: No        Weight Concern: Not Asked        Special Diet: No        Back Care: Not Asked        Exercise: Yes          strength         B

## 2021-06-22 ENCOUNTER — NURSE TRIAGE (OUTPATIENT)
Dept: INTERNAL MEDICINE CLINIC | Facility: CLINIC | Age: 36
End: 2021-06-22

## 2021-06-22 NOTE — TELEPHONE ENCOUNTER
Noted.      Future Appointments   Date Time Provider Jerad Noonan   6/23/2021 10:40 AM MD REYES DiehlO

## 2021-06-22 NOTE — TELEPHONE ENCOUNTER
Action Requested: Summary for Provider     []  Critical Lab, Recommendations Needed  [] Need Additional Advice  [x]   FYI    []   Need Orders  [] Need Medications Sent to Pharmacy  []  Other     SUMMARY:   Spoke with new pt,  verified, pt stated she is

## 2021-06-23 ENCOUNTER — HOSPITAL ENCOUNTER (OUTPATIENT)
Dept: GENERAL RADIOLOGY | Age: 36
Discharge: HOME | End: 2021-06-23
Attending: INTERNAL MEDICINE
Payer: MEDICAID

## 2021-06-23 ENCOUNTER — OFFICE VISIT (OUTPATIENT)
Dept: INTERNAL MEDICINE CLINIC | Facility: CLINIC | Age: 36
End: 2021-06-23
Payer: MEDICAID

## 2021-06-23 VITALS
TEMPERATURE: 98 F | DIASTOLIC BLOOD PRESSURE: 70 MMHG | SYSTOLIC BLOOD PRESSURE: 106 MMHG | BODY MASS INDEX: 27.79 KG/M2 | HEART RATE: 74 BPM | WEIGHT: 151 LBS | HEIGHT: 62 IN

## 2021-06-23 DIAGNOSIS — M54.10 RADICULOPATHY AFFECTING UPPER EXTREMITY: ICD-10-CM

## 2021-06-23 DIAGNOSIS — M79.2 NEURITIS: ICD-10-CM

## 2021-06-23 DIAGNOSIS — G56.03 CARPAL TUNNEL SYNDROME ON BOTH SIDES: Primary | ICD-10-CM

## 2021-06-23 PROCEDURE — 3074F SYST BP LT 130 MM HG: CPT | Performed by: INTERNAL MEDICINE

## 2021-06-23 PROCEDURE — 72040 X-RAY EXAM NECK SPINE 2-3 VW: CPT | Performed by: INTERNAL MEDICINE

## 2021-06-23 PROCEDURE — 3008F BODY MASS INDEX DOCD: CPT | Performed by: INTERNAL MEDICINE

## 2021-06-23 PROCEDURE — 3078F DIAST BP <80 MM HG: CPT | Performed by: INTERNAL MEDICINE

## 2021-06-23 PROCEDURE — 99204 OFFICE O/P NEW MOD 45 MIN: CPT | Performed by: INTERNAL MEDICINE

## 2021-06-23 RX ORDER — METHYLPREDNISOLONE 4 MG/1
TABLET ORAL
Qty: 1 EACH | Refills: 0 | Status: SHIPPED | OUTPATIENT
Start: 2021-06-23

## 2021-06-23 NOTE — PROGRESS NOTES
History of Present Illness   Patient ID: Marley Kent is a 29year old female. Chief Complaint: Numbness (b/l hands, whole R arm numb/pain unable to lift)      Numbness  This is a new (neck numbess with bilateral arm/hand numbess) problem.  Episode onset Right forearm: Normal.      Left forearm: Normal.      Right wrist: Tenderness (+phalens) present. No crepitus. Decreased range of motion. Normal pulse. Left wrist: Tenderness (+phalens) present. No crepitus. Decreased range of motion.  Normal puls improvement with Medrol Dosepak, and carpal tunnel splints then follow-up with physiatry to discuss further. Follow Up:   Return if symptoms worsen or fail to improve. Labs & Imaging  Pertinent labs and imaging reviewed in [facility] EMR.      Medical occur during pregnancy and with use of birth control pills. It is more common in workers who must often bend their wrists. It is also common in people who work with power tools that cause strong vibrations.   Home care  · Rest the painful wrist. Avoid repea improving with the above treatment  · Fingers or hand become cold, blue, numb, or tingly  · Your whole arm becomes swollen or weak  Priyank last reviewed this educational content on 5/1/2018  © 2062-2265 [employer]. All rights reserved.  Eunice Segovia same way for long periods of time. Even simple, light tasks can cause injury this way. Instead, switch tasks or switch hands. Rest your hands  Give your hands a break from time to time with a rest. Even a few minutes once an hour can help.   Reduce speed a they may be noticed during the day as well. They may get worse with activities such as driving, reading, typing, or holding a phone.  Symptoms can include:  · Tingling and numbness in the hand or wrist  · Sharp pain that shoots up the arm or down to the fin symptoms that may spread into the chest or down the arm. To understand this condition, it helps to understand the parts of the spine:  · Vertebrae. These are bones that stack to form the spine. The cervical spine contains the 7 vertebrae in the neck.   · Felix Ellis help relieve pain and swelling. · Cold packs. These help reduce pain. · Resting. This involves avoiding positions and activities that increase pain. · Neck brace (cervical collar). This can help relieve inflammation and pain.   · Physical therapy, includ side. It can also cause weakness of the muscles that the nerve controls. A pinched nerve may start after a sudden turning or bending force (such as in a car accident) or after a simple awkward movement.  In either case, muscle spasm is commonly present an may look at them. You will be told of any new findings that may affect your care.    When to seek medical advice  Call your healthcare provider right away if any of these occur:  · Pain becomes worse even after taking prescribed pain medicine  · Weakness in

## 2021-06-23 NOTE — PATIENT INSTRUCTIONS
Carpal Tunnel Syndrome    Carpal tunnel syndrome is a painful condition of the wrist and arm. It is caused by pressure on the median nerve. The median nerve is one of the nerves that give feeling and movement to the hand.  It passes through a tunnel in th talk with your healthcare provider before using these medicines. · Opioid pain medicine will only give temporary relief and does not treat the problem.  If pain continues, you may need a shot of a steroid drug into your wrist.  · If the above methods fail, bent (flexed) position for long periods of time. This includes extended or twisted positions. When you sleep, don't have your wrist flexed (don't sleep all curled up on your side).  And don't put extra pressure on your wrist for long periods of time (don't ligament may harden and shorten. This narrows the space in the carpal tunnel and puts pressure on the median nerve. This pressure leads to tingling and numbness of the hand and wrist. In time, the condition can make even simple tasks hard to do.   What caus healthcare provider  Call your healthcare provider right away if you have any of these:  · Fever of 100.4°F (38°C) or higher, or as directed  · Symptoms that don’t get better, or get worse  · New symptoms   Priyank last reviewed this educational content o they leave the spinal canal.  · An unstable spine. This is when a vertebra slips forward. It can then press on a nerve root. There are other, less common causes of pressure on nerves in the neck. These include infection, cysts, and tumors.   Symptoms of ce healthcare professional's instructions. Pinched Nerve in the Neck  A pinched nerve in the neck (cervical radiculopathy) is caused when the nerve that goes from the spinal cord to the neck or arm is irritated or has pressure on it.  This may be caused [place] had a stomach ulcer or gastrointestinal bleeding. · Reduce stress. Stress can make it longer for your pain to go away. · Do any exercises or stretches that were given to you as part of your discharge plan. · Wear a soft collar, if prescribed.   ·

## 2022-01-27 ENCOUNTER — OFFICE VISIT (OUTPATIENT)
Dept: OBGYN CLINIC | Facility: CLINIC | Age: 37
End: 2022-01-27
Payer: MEDICAID

## 2022-01-27 ENCOUNTER — LAB ENCOUNTER (OUTPATIENT)
Dept: LAB | Facility: HOSPITAL | Age: 37
End: 2022-01-27
Attending: OBSTETRICS & GYNECOLOGY
Payer: MEDICAID

## 2022-01-27 VITALS
SYSTOLIC BLOOD PRESSURE: 135 MMHG | HEART RATE: 73 BPM | WEIGHT: 155 LBS | DIASTOLIC BLOOD PRESSURE: 83 MMHG | BODY MASS INDEX: 28 KG/M2

## 2022-01-27 DIAGNOSIS — N92.1 MENORRHAGIA WITH IRREGULAR CYCLE: ICD-10-CM

## 2022-01-27 DIAGNOSIS — N89.8 VAGINAL DISCHARGE: ICD-10-CM

## 2022-01-27 DIAGNOSIS — Z11.3 SCREENING FOR STDS (SEXUALLY TRANSMITTED DISEASES): Primary | ICD-10-CM

## 2022-01-27 DIAGNOSIS — Z11.3 SCREENING FOR STDS (SEXUALLY TRANSMITTED DISEASES): ICD-10-CM

## 2022-01-27 PROCEDURE — 86780 TREPONEMA PALLIDUM: CPT

## 2022-01-27 PROCEDURE — 87389 HIV-1 AG W/HIV-1&-2 AB AG IA: CPT

## 2022-01-27 PROCEDURE — 3079F DIAST BP 80-89 MM HG: CPT | Performed by: ADVANCED PRACTICE MIDWIFE

## 2022-01-27 PROCEDURE — 99213 OFFICE O/P EST LOW 20 MIN: CPT | Performed by: ADVANCED PRACTICE MIDWIFE

## 2022-01-27 PROCEDURE — 3075F SYST BP GE 130 - 139MM HG: CPT | Performed by: ADVANCED PRACTICE MIDWIFE

## 2022-01-27 PROCEDURE — 36415 COLL VENOUS BLD VENIPUNCTURE: CPT

## 2022-01-27 NOTE — PROGRESS NOTES
Subjective:   Patient ID: Sukhdeep Lux is a 29year old female. Carmen Longo presents for STI screening. States her  had syphilis about 10 years ago and was treated. 1 week ago he noticed a sore on his genitals.  Went to doctor today and they tested General: Normal vulva. Pubic Area: No rash. Labia:         Right: No rash, tenderness, lesion or injury. Left: No rash, tenderness, lesion or injury. Urethra: No prolapse, urethral swelling or urethral lesion.       Vagina: No signs menses. Irregular menses possibly due to covid vaccine or possibly due to recent tubal ligation. Encouraged patient to take 600mg ibuprofen every 6 hours with start of menses and throughout period.  If irregular bleeding continues or experiences bleeding li

## 2022-01-28 DIAGNOSIS — Z11.3 SCREEN FOR STD (SEXUALLY TRANSMITTED DISEASE): Primary | ICD-10-CM

## 2022-01-28 LAB — T PALLIDUM AB SER QL: NEGATIVE

## 2022-01-30 LAB
GENITAL VAGINOSIS SCREEN: NEGATIVE
TRICHOMONAS SCREEN: NEGATIVE

## 2022-01-31 LAB
C TRACH DNA SPEC QL NAA+PROBE: NEGATIVE
N GONORRHOEA DNA SPEC QL NAA+PROBE: NEGATIVE

## 2022-02-28 ENCOUNTER — TELEPHONE (OUTPATIENT)
Dept: OBGYN CLINIC | Facility: CLINIC | Age: 37
End: 2022-02-28

## 2022-04-30 ENCOUNTER — TELEPHONE (OUTPATIENT)
Dept: OBGYN CLINIC | Facility: CLINIC | Age: 37
End: 2022-04-30

## 2022-04-30 NOTE — TELEPHONE ENCOUNTER
Reminded pt to complete overdue trep. Pt states she will complete next week.  Pt verbalized an understanding & agrees w/ plan

## 2022-06-30 ENCOUNTER — TELEPHONE (OUTPATIENT)
Dept: OBGYN CLINIC | Facility: CLINIC | Age: 37
End: 2022-06-30

## 2022-06-30 NOTE — TELEPHONE ENCOUNTER
Reminded pt to complete overdue trep. Pt states she has been busy but will complete next week.  Pt verbalized an understanding & agrees w/ plan

## 2023-05-09 NOTE — PROGRESS NOTES
Advance Care Planning and Advance Directives     You make decisions on a daily basis - decisions about where you want to live, your career, your home, your life. Perhaps one of the most important decisions you face is your choice for future medical care. Take time to talk with your family and your healthcare team and start planning today.  Advance Care Planning is a process that can help you:  Understand possible future healthcare decisions in light of your own experiences  Reflect on those decision in light of your goals and values  Discuss your decisions with those closest to you and the healthcare professionals that care for you  Make a plan by creating a document that reflects your wishes    Surrogate Decision Maker  In the event of a medical emergency, which has left you unable to communicate or to make your own decisions, you would need someone to make decisions for you.  It is important to discuss your preferences for medical treatment with this person while you are in good health.     Qualities of a surrogate decision maker:  Willing to take on this role and responsibility  Knows what you want for future medical care  Willing to follow your wishes even if they don't agree with them  Able to make difficult medical decisions under stressful circumstances    Advance Directives  These are legal documents you can create that will guide your healthcare team and decision maker(s) when needed. These documents can be stored in the electronic medical record.    Living Will - a legal document to guide your care if you have a terminal condition or a serious illness and are unable to communicate. States vary by statute in document names/types, but most forms may include one or more of the following:        -  Directions regarding life-prolonging treatments        -  Directions regarding artificially provided nutrition/hydration        -  Choosing a healthcare decision maker        -  Direction regarding organ/tissue  Pt here for a routine ultrasound donation    Durable Power of  for Healthcare - this document names an -in-fact to make medical decisions for you, but it may also allow this person to make personal and financial decisions for you. Please seek the advice of an  if you need this type of document.    **Advance Directives are not required and no one may discriminate against you if you do not sign one.    Medical Orders  Many states allow specific forms/orders signed by your physician to record your wishes for medical treatment in your current state of health. This form, signed in personal communication with your physician, addresses resuscitation and other medical interventions that you may or may not want.      For more information or to schedule a time with a [facility] Advance Care Planning Facilitator contact: [facility] or call [phone] and someone will contact you directly.BMI for Adults  What is BMI?  Body mass index (BMI) is a number that is calculated from a person's weight and height. BMI can help estimate how much of a person's weight is composed of fat. BMI does not measure body fat directly. Rather, it is an alternative to procedures that directly measure body fat, which can be difficult and expensive.  BMI can help identify people who may be at higher risk for certain medical problems.  What are BMI measurements used for?  BMI is used as a screening tool to identify possible weight problems. It helps determine whether a person is obese, overweight, a healthy weight, or underweight.  BMI is useful for:  Identifying a weight problem that may be related to a medical condition or may increase the risk for medical problems.  Promoting changes, such as changes in diet and exercise, to help reach a healthy weight. BMI screening can be repeated to see if these changes are working.  How is BMI calculated?  BMI involves measuring your weight in relation to your height. Both height and weight are measured,  "and the BMI is calculated from those numbers. This can be done either in English (U.S.) or metric measurements. Note that charts and online BMI calculators are available to help you find your BMI quickly and easily without having to do these calculations yourself.  To calculate your BMI in English (U.S.) measurements:    Measure your weight in pounds (lb).  Multiply the number of pounds by 703.  For example, for a person who weighs 180 lb, multiply that number by 703, which equals 126,540.  Measure your height in inches. Then multiply that number by itself to get a measurement called \"inches squared.\"  For example, for a person who is 70 inches tall, the \"inches squared\" measurement is 70 inches x 70 inches, which equals 4,900 inches squared.  Divide the total from step 2 (number of lb x 703) by the total from step 3 (inches squared): 126,540 ÷ 4,900 = 25.8. This is your BMI.    To calculate your BMI in metric measurements:  Measure your weight in kilograms (kg).  Measure your height in meters (m). Then multiply that number by itself to get a measurement called \"meters squared.\"  For example, for a person who is 1.75 m tall, the \"meters squared\" measurement is 1.75 m x 1.75 m, which is equal to 3.1 meters squared.  Divide the number of kilograms (your weight) by the meters squared number. In this example: 70 ÷ 3.1 = 22.6. This is your BMI.  What do the results mean?  BMI charts are used to identify whether you are underweight, normal weight, overweight, or obese. The following guidelines will be used:  Underweight: BMI less than 18.5.  Normal weight: BMI between 18.5 and 24.9.  Overweight: BMI between 25 and 29.9.  Obese: BMI of 30 or above.  Keep these notes in mind:  Weight includes both fat and muscle, so someone with a muscular build, such as an athlete, may have a BMI that is higher than 24.9. In cases like these, BMI is not an accurate measure of body fat.  To determine if excess body fat is the cause of a BMI " of 25 or higher, further assessments may need to be done by a health care provider.  BMI is usually interpreted in the same way for men and women.  Where to find more information  For more information about BMI, including tools to quickly calculate your BMI, go to these websites:  Centers for Disease Control and Prevention: www.cdc.gov  American Heart Association: www.heart.org  National Heart, Lung, and Blood [place]: www.nhlbi.nih.gov  Summary  Body mass index (BMI) is a number that is calculated from a person's weight and height.  BMI may help estimate how much of a person's weight is composed of fat. BMI can help identify those who may be at higher risk for certain medical problems.  BMI can be measured using English measurements or metric measurements.  BMI charts are used to identify whether you are underweight, normal weight, overweight, or obese.  This information is not intended to replace advice given to you by your health care provider. Make sure you discuss any questions you have with your health care provider.  Document Revised: 09/09/2020 Document Reviewed: 07/17/2020  [employer] Patient Education © 2021 [employer]

## 2024-01-16 ENCOUNTER — HOSPITAL ENCOUNTER (EMERGENCY)
Age: 39
End: 2024-01-16

## 2025-03-09 NOTE — TELEPHONE ENCOUNTER
Acetclucas, pt has overdue trep.  Ordered at [facility]
Patient returned call would like a call back
Per EDUARD note. Pt to complete repeat tep 90 days after initial test on 1/27/22. Advised order also placed at [facility] for completion. Pt states partner +HSV. Desires testing. Denies any lesions. States partner has had cold sores in the past. Discussed HSV 1 & 2. Offered virtual appt to discuss further w/ EDUARD & pt accepted & scheduled.  Pt verbalized an understanding & agrees w/ plan
Name band;

## (undated) DIAGNOSIS — Z20.2 EXPOSURE TO SYPHILIS: Primary | ICD-10-CM

## (undated) DEVICE — LIGASURE LAP MARYLAND 37CM

## (undated) DEVICE — TROCAR: Brand: KII FIOS FIRST ENTRY

## (undated) DEVICE — INSUFFLATION NEEDLE TO ESTABLISH PNEUMOPERITONEUM.: Brand: INSUFFLATION NEEDLE

## (undated) DEVICE — MANIPULATOR CATH ESCP KRNR

## (undated) DEVICE — TROCAR: Brand: KII® SLEEVE

## (undated) DEVICE — UNDYED BRAIDED (POLYGLACTIN 910), SYNTHETIC ABSORBABLE SUTURE: Brand: COATED VICRYL

## (undated) DEVICE — [HIGH FLOW INSUFFLATOR,  DO NOT USE IF PACKAGE IS DAMAGED,  KEEP DRY,  KEEP AWAY FROM SUNLIGHT,  PROTECT FROM HEAT AND RADIOACTIVE SOURCES.]: Brand: PNEUMOSURE

## (undated) DEVICE — SOL  .9 3000ML

## (undated) DEVICE — 3M™ STERI-STRIP™ COMPOUND BENZOIN TINCTURE 40 BAGS/CARTON 4 CARTONS/CASE C1544: Brand: 3M™ STERI-STRIP™

## (undated) DEVICE — STERILE SURGICAL LUBRICANT, METAL TUBE: Brand: SURGILUBE

## (undated) DEVICE — LAPAROSCOPY: Brand: MEDLINE INDUSTRIES, INC.

## (undated) DEVICE — SOL  .9 1000ML BTL

## (undated) NOTE — LETTER
VACCINE ADMINISTRATION RECORD  PARENT / GUARDIAN APPROVAL  Date: 8/10/2020  Vaccine administered to: Hu Alex     : 3/15/1985    MRN: [id number]    A copy of the appropriate Centers for Disease Control and Prevention Vaccine Information statement

## (undated) NOTE — LETTER
AUTHORIZATION FOR SURGICAL OPERATION OR OTHER PROCEDURE    1.  I hereby authorize Dr. Juliette De La Fuente, and [facility] staff assigned to my case to perform the following operation and/or procedure at the [facility]: Cervical Cerclage removal (please print)      Patient signature:  ___________________________________________________             Relationship to Patient:           []  Parent    Responsible person                          []  Spouse  In case of minor or                    [] Other

## (undated) NOTE — LETTER
[address]      Authorization for Surgical Operation and Procedure     Date:_10/24/2020__________                                                                                                         Ti 4.   Should the need arise during my operation or immediate post-operative period, I also consent to the administration of blood and/or blood products.   Further, I understand that despite careful testing and screening of blood or blood products by karlee 8.   I recognize that in the event my procedure results in extended X-Ray/fluoroscopy time, I may develop a skin reaction. 9.  If I have a Do Not Attempt Resuscitation (DNAR) order in place, that status will be suspended while in the operating room, proc STATEMENT OF PHYSICIAN My signature below affirms that prior to the time of the procedure; I have explained to the patient and/or his/her legal representative, the risks and benefits involved in the proposed treatment and any reasonable alternative to the

## (undated) NOTE — LETTER
[facility]  Administration of Anesthesia  1. I, Andrew Dey, or _________________________________ acting on her behalf, (Patient) (Dependent/Representative) request to receive anesthesia for my pending procedure/operation/treatment.   A 6. OBSTETRIC PATIENTS: Specific risks/consequences of spinal/epidural anesthesia may include itching, low blood pressure, difficulty urinating, slowing of the baby's heart rate and headache.  Rare risks include infections, high spinal block, spinal bleeding ___________________________________________________           _____________________________________________________  Date/Time                                                                                               Responsible person in case of minor